# Patient Record
Sex: FEMALE | Race: WHITE | NOT HISPANIC OR LATINO | Employment: FULL TIME | URBAN - METROPOLITAN AREA
[De-identification: names, ages, dates, MRNs, and addresses within clinical notes are randomized per-mention and may not be internally consistent; named-entity substitution may affect disease eponyms.]

---

## 2021-11-22 ENCOUNTER — PRE-ADMISSION TESTING (OUTPATIENT)
Dept: ADMISSIONS | Facility: MEDICAL CENTER | Age: 31
End: 2021-11-22
Attending: OBSTETRICS & GYNECOLOGY
Payer: COMMERCIAL

## 2021-11-24 ENCOUNTER — PRE-ADMISSION TESTING (OUTPATIENT)
Dept: ADMISSIONS | Facility: MEDICAL CENTER | Age: 31
End: 2021-11-24
Attending: OBSTETRICS & GYNECOLOGY
Payer: COMMERCIAL

## 2021-11-24 DIAGNOSIS — Z01.812 PRE-PROCEDURAL LABORATORY EXAMINATION: ICD-10-CM

## 2021-11-24 LAB
ANION GAP SERPL CALC-SCNC: 12 MMOL/L (ref 7–16)
B-HCG SERPL-ACNC: <1 MIU/ML (ref 0–5)
BASOPHILS # BLD AUTO: 0.3 % (ref 0–1.8)
BASOPHILS # BLD: 0.02 K/UL (ref 0–0.12)
BUN SERPL-MCNC: 14 MG/DL (ref 8–22)
CALCIUM SERPL-MCNC: 9.7 MG/DL (ref 8.5–10.5)
CHLORIDE SERPL-SCNC: 105 MMOL/L (ref 96–112)
CO2 SERPL-SCNC: 24 MMOL/L (ref 20–33)
CREAT SERPL-MCNC: 0.82 MG/DL (ref 0.5–1.4)
EOSINOPHIL # BLD AUTO: 0.12 K/UL (ref 0–0.51)
EOSINOPHIL NFR BLD: 1.7 % (ref 0–6.9)
ERYTHROCYTE [DISTWIDTH] IN BLOOD BY AUTOMATED COUNT: 40.7 FL (ref 35.9–50)
GLUCOSE SERPL-MCNC: 89 MG/DL (ref 65–99)
HCT VFR BLD AUTO: 42.3 % (ref 37–47)
HGB BLD-MCNC: 13.8 G/DL (ref 12–16)
IMM GRANULOCYTES # BLD AUTO: 0.03 K/UL (ref 0–0.11)
IMM GRANULOCYTES NFR BLD AUTO: 0.4 % (ref 0–0.9)
LYMPHOCYTES # BLD AUTO: 2.16 K/UL (ref 1–4.8)
LYMPHOCYTES NFR BLD: 31.4 % (ref 22–41)
MCH RBC QN AUTO: 28.2 PG (ref 27–33)
MCHC RBC AUTO-ENTMCNC: 32.6 G/DL (ref 33.6–35)
MCV RBC AUTO: 86.5 FL (ref 81.4–97.8)
MONOCYTES # BLD AUTO: 0.51 K/UL (ref 0–0.85)
MONOCYTES NFR BLD AUTO: 7.4 % (ref 0–13.4)
NEUTROPHILS # BLD AUTO: 4.04 K/UL (ref 2–7.15)
NEUTROPHILS NFR BLD: 58.8 % (ref 44–72)
NRBC # BLD AUTO: 0 K/UL
NRBC BLD-RTO: 0 /100 WBC
PLATELET # BLD AUTO: 359 K/UL (ref 164–446)
PMV BLD AUTO: 9.4 FL (ref 9–12.9)
POTASSIUM SERPL-SCNC: 4.4 MMOL/L (ref 3.6–5.5)
RBC # BLD AUTO: 4.89 M/UL (ref 4.2–5.4)
SODIUM SERPL-SCNC: 141 MMOL/L (ref 135–145)
WBC # BLD AUTO: 6.9 K/UL (ref 4.8–10.8)

## 2021-11-24 PROCEDURE — 80048 BASIC METABOLIC PNL TOTAL CA: CPT

## 2021-11-24 PROCEDURE — 85025 COMPLETE CBC W/AUTO DIFF WBC: CPT

## 2021-11-24 PROCEDURE — 84702 CHORIONIC GONADOTROPIN TEST: CPT

## 2021-11-24 PROCEDURE — 36415 COLL VENOUS BLD VENIPUNCTURE: CPT

## 2021-12-03 NOTE — H&P
DATE OF ADMISSION:  12/06/2021     CHIEF COMPLAINT:  Chronic pelvic pain with heavy vaginal bleeding.     HISTORY OF PRESENT ILLNESS:  This is a 31-year-old female who presented   approximately 2 months ago with complaint of chronic pain and heavy irregular   vaginal bleeding.  She had been using in the past Depo-Provera and a Mirena   IUD.  She tolerated both of these poorly.  They did not help with her   associated pain.  She describes periods are prolonged with passing of clots.    She has more pain with intercourse and sometimes pain with defecation during   her menstrual cycle.  Contraceptions, her  has a vasectomy.  She has   had 2 vaginal births.     PAST MEDICAL HISTORY:  She has a history of migraine headaches.     PAST SURGICAL HISTORY:  She has had no prior surgery.     ALLERGIES:  LATEX sensitivity.     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  Drinks alcohol on   occasion.  Denies tobacco use currently, or recreational drug use.     FAMILY HISTORY:  Mother has a history of diabetes.     REVIEW OF SYSTEMS:  She has a history of weight gain, breast pain, anxiety,   depression, and constipation.     PHYSICAL EXAMINATION:  VITAL SIGNS:  Her height is 5 feet 5 inches, her weight is 187.  Her BMI is   31, blood pressure 122/82.  She is afebrile.  GENERAL APPEARANCE:  The patient is overweight, otherwise healthy appearing,   in no distress.  LUNGS:  Clear to auscultation.  HEART:  Regular rate and rhythm.  BREASTS:  No dominant masses.  ABDOMEN:  Soft and nontender.  GYNECOLOGIC:  Uterus is seemingly normal in size and nontender.  She does have   tenderness in the cul-de-sac with possible uterosacral nodularity.  Adnexa   are benign bilaterally.     IMPRESSION:  Chronic pelvic pain with abnormal uterine bleeding, desires   hysterectomy.     PLAN:  The patient is scheduled to undergo laparoscopic-assisted vaginal   hysterectomy with bilateral salpingectomy and possible bilateral  oophorectomy.    She understands the associated risks and complications of the surgery.  She   understands the limitations and pain relief and pain control.  She understands   the possibility of converting to an open procedure in the event of inability   to complete the surgery vaginally.  Understanding of the risks and   complications she wants to proceed with the surgery as scheduled.        ______________________________  MD JAVIER HERNANDEZ/ANABEL    DD:  12/02/2021 16:01  DT:  12/02/2021 17:02    Job#:  [id number]

## 2021-12-06 ENCOUNTER — HOSPITAL ENCOUNTER (OUTPATIENT)
Facility: MEDICAL CENTER | Age: 31
End: 2021-12-06
Attending: OBSTETRICS & GYNECOLOGY | Admitting: OBSTETRICS & GYNECOLOGY
Payer: COMMERCIAL

## 2021-12-06 ENCOUNTER — ANESTHESIA (OUTPATIENT)
Dept: SURGERY | Facility: MEDICAL CENTER | Age: 31
End: 2021-12-06
Payer: COMMERCIAL

## 2021-12-06 ENCOUNTER — ANESTHESIA EVENT (OUTPATIENT)
Dept: SURGERY | Facility: MEDICAL CENTER | Age: 31
End: 2021-12-06
Payer: COMMERCIAL

## 2021-12-06 VITALS
HEART RATE: 81 BPM | DIASTOLIC BLOOD PRESSURE: 63 MMHG | BODY MASS INDEX: 29.83 KG/M2 | HEIGHT: 67 IN | RESPIRATION RATE: 16 BRPM | WEIGHT: 190.04 LBS | OXYGEN SATURATION: 93 % | SYSTOLIC BLOOD PRESSURE: 108 MMHG | TEMPERATURE: 98 F

## 2021-12-06 LAB
EXTERNAL QUALITY CONTROL: NORMAL
HCG UR QL: NEGATIVE
PATHOLOGY CONSULT NOTE: NORMAL
SARS-COV+SARS-COV-2 AG RESP QL IA.RAPID: NEGATIVE

## 2021-12-06 PROCEDURE — 700111 HCHG RX REV CODE 636 W/ 250 OVERRIDE (IP): Performed by: ANESTHESIOLOGY

## 2021-12-06 PROCEDURE — 501572 HCHG TROCAR, SHIELD OBTU 5X100: Performed by: OBSTETRICS & GYNECOLOGY

## 2021-12-06 PROCEDURE — 160048 HCHG OR STATISTICAL LEVEL 1-5: Performed by: OBSTETRICS & GYNECOLOGY

## 2021-12-06 PROCEDURE — 502240 HCHG MISC OR SUPPLY RC 0272: Performed by: OBSTETRICS & GYNECOLOGY

## 2021-12-06 PROCEDURE — 160035 HCHG PACU - 1ST 60 MINS PHASE I: Performed by: OBSTETRICS & GYNECOLOGY

## 2021-12-06 PROCEDURE — 160002 HCHG RECOVERY MINUTES (STAT): Performed by: OBSTETRICS & GYNECOLOGY

## 2021-12-06 PROCEDURE — 160046 HCHG PACU - 1ST 60 MINS PHASE II: Performed by: OBSTETRICS & GYNECOLOGY

## 2021-12-06 PROCEDURE — 700101 HCHG RX REV CODE 250: Performed by: OBSTETRICS & GYNECOLOGY

## 2021-12-06 PROCEDURE — 700102 HCHG RX REV CODE 250 W/ 637 OVERRIDE(OP): Performed by: ANESTHESIOLOGY

## 2021-12-06 PROCEDURE — 700105 HCHG RX REV CODE 258: Performed by: OBSTETRICS & GYNECOLOGY

## 2021-12-06 PROCEDURE — 160041 HCHG SURGERY MINUTES - EA ADDL 1 MIN LEVEL 4: Performed by: OBSTETRICS & GYNECOLOGY

## 2021-12-06 PROCEDURE — 160025 RECOVERY II MINUTES (STATS): Performed by: OBSTETRICS & GYNECOLOGY

## 2021-12-06 PROCEDURE — 88307 TISSUE EXAM BY PATHOLOGIST: CPT

## 2021-12-06 PROCEDURE — 501330 HCHG SET, CYSTO IRRIG TUBING: Performed by: OBSTETRICS & GYNECOLOGY

## 2021-12-06 PROCEDURE — 81025 URINE PREGNANCY TEST: CPT

## 2021-12-06 PROCEDURE — 500886 HCHG PACK, LAPAROSCOPY: Performed by: OBSTETRICS & GYNECOLOGY

## 2021-12-06 PROCEDURE — 501411 HCHG SPONGE, BABY LAP W/O RINGS: Performed by: OBSTETRICS & GYNECOLOGY

## 2021-12-06 PROCEDURE — 160029 HCHG SURGERY MINUTES - 1ST 30 MINS LEVEL 4: Performed by: OBSTETRICS & GYNECOLOGY

## 2021-12-06 PROCEDURE — 160009 HCHG ANES TIME/MIN: Performed by: OBSTETRICS & GYNECOLOGY

## 2021-12-06 PROCEDURE — 500868 HCHG NEEDLE, SURGI(VARES): Performed by: OBSTETRICS & GYNECOLOGY

## 2021-12-06 PROCEDURE — 502704 HCHG DEVICE, LIGASURE IMPACT: Performed by: OBSTETRICS & GYNECOLOGY

## 2021-12-06 PROCEDURE — 501568 HCHG TROCAR, BLUNTPORT 12MM: Performed by: OBSTETRICS & GYNECOLOGY

## 2021-12-06 PROCEDURE — 501583 HCHG TROCAR, THRD CAN&SEAL 5X100: Performed by: OBSTETRICS & GYNECOLOGY

## 2021-12-06 PROCEDURE — 700101 HCHG RX REV CODE 250: Performed by: ANESTHESIOLOGY

## 2021-12-06 PROCEDURE — 87426 SARSCOV CORONAVIRUS AG IA: CPT | Performed by: OBSTETRICS & GYNECOLOGY

## 2021-12-06 PROCEDURE — 501838 HCHG SUTURE GENERAL: Performed by: OBSTETRICS & GYNECOLOGY

## 2021-12-06 PROCEDURE — A9270 NON-COVERED ITEM OR SERVICE: HCPCS | Performed by: ANESTHESIOLOGY

## 2021-12-06 RX ORDER — HALOPERIDOL 5 MG/ML
1 INJECTION INTRAMUSCULAR
Status: DISCONTINUED | OUTPATIENT
Start: 2021-12-06 | End: 2021-12-06 | Stop reason: HOSPADM

## 2021-12-06 RX ORDER — EPINEPHRINE 1 MG/ML(1)
AMPUL (ML) INJECTION
Status: DISCONTINUED
Start: 2021-12-06 | End: 2021-12-06 | Stop reason: HOSPADM

## 2021-12-06 RX ORDER — LIDOCAINE HYDROCHLORIDE 40 MG/ML
SOLUTION TOPICAL PRN
Status: DISCONTINUED | OUTPATIENT
Start: 2021-12-06 | End: 2021-12-06 | Stop reason: SURG

## 2021-12-06 RX ORDER — ONDANSETRON 2 MG/ML
4 INJECTION INTRAMUSCULAR; INTRAVENOUS
Status: DISCONTINUED | OUTPATIENT
Start: 2021-12-06 | End: 2021-12-06 | Stop reason: HOSPADM

## 2021-12-06 RX ORDER — SODIUM CHLORIDE, SODIUM LACTATE, POTASSIUM CHLORIDE, CALCIUM CHLORIDE 600; 310; 30; 20 MG/100ML; MG/100ML; MG/100ML; MG/100ML
INJECTION, SOLUTION INTRAVENOUS CONTINUOUS
Status: DISCONTINUED | OUTPATIENT
Start: 2021-12-06 | End: 2021-12-06 | Stop reason: HOSPADM

## 2021-12-06 RX ORDER — KETOROLAC TROMETHAMINE 30 MG/ML
INJECTION, SOLUTION INTRAMUSCULAR; INTRAVENOUS PRN
Status: DISCONTINUED | OUTPATIENT
Start: 2021-12-06 | End: 2021-12-06 | Stop reason: SURG

## 2021-12-06 RX ORDER — BUPIVACAINE HYDROCHLORIDE 2.5 MG/ML
INJECTION, SOLUTION EPIDURAL; INFILTRATION; INTRACAUDAL
Status: DISCONTINUED
Start: 2021-12-06 | End: 2021-12-06 | Stop reason: HOSPADM

## 2021-12-06 RX ORDER — MIDAZOLAM HYDROCHLORIDE 1 MG/ML
INJECTION INTRAMUSCULAR; INTRAVENOUS PRN
Status: DISCONTINUED | OUTPATIENT
Start: 2021-12-06 | End: 2021-12-06 | Stop reason: SURG

## 2021-12-06 RX ORDER — FUROSEMIDE 10 MG/ML
INJECTION INTRAMUSCULAR; INTRAVENOUS
Status: COMPLETED
Start: 2021-12-06 | End: 2021-12-06

## 2021-12-06 RX ORDER — DIPHENHYDRAMINE HYDROCHLORIDE 50 MG/ML
12.5 INJECTION INTRAMUSCULAR; INTRAVENOUS
Status: DISCONTINUED | OUTPATIENT
Start: 2021-12-06 | End: 2021-12-06 | Stop reason: HOSPADM

## 2021-12-06 RX ORDER — OXYCODONE HYDROCHLORIDE AND ACETAMINOPHEN 5; 325 MG/1; MG/1
1 TABLET ORAL
Status: COMPLETED | OUTPATIENT
Start: 2021-12-06 | End: 2021-12-06

## 2021-12-06 RX ORDER — DEXAMETHASONE SODIUM PHOSPHATE 4 MG/ML
INJECTION, SOLUTION INTRA-ARTICULAR; INTRALESIONAL; INTRAMUSCULAR; INTRAVENOUS; SOFT TISSUE PRN
Status: DISCONTINUED | OUTPATIENT
Start: 2021-12-06 | End: 2021-12-06 | Stop reason: SURG

## 2021-12-06 RX ORDER — HYDROMORPHONE HYDROCHLORIDE 2 MG/ML
INJECTION, SOLUTION INTRAMUSCULAR; INTRAVENOUS; SUBCUTANEOUS PRN
Status: DISCONTINUED | OUTPATIENT
Start: 2021-12-06 | End: 2021-12-06 | Stop reason: SURG

## 2021-12-06 RX ORDER — ONDANSETRON 2 MG/ML
INJECTION INTRAMUSCULAR; INTRAVENOUS PRN
Status: DISCONTINUED | OUTPATIENT
Start: 2021-12-06 | End: 2021-12-06 | Stop reason: SURG

## 2021-12-06 RX ORDER — FUROSEMIDE 10 MG/ML
INJECTION INTRAMUSCULAR; INTRAVENOUS PRN
Status: DISCONTINUED | OUTPATIENT
Start: 2021-12-06 | End: 2021-12-06 | Stop reason: SURG

## 2021-12-06 RX ORDER — CEFAZOLIN SODIUM 1 G/3ML
INJECTION, POWDER, FOR SOLUTION INTRAMUSCULAR; INTRAVENOUS PRN
Status: DISCONTINUED | OUTPATIENT
Start: 2021-12-06 | End: 2021-12-06 | Stop reason: SURG

## 2021-12-06 RX ORDER — BUPIVACAINE HYDROCHLORIDE AND EPINEPHRINE 2.5; 5 MG/ML; UG/ML
INJECTION, SOLUTION EPIDURAL; INFILTRATION; INTRACAUDAL; PERINEURAL
Status: DISCONTINUED | OUTPATIENT
Start: 2021-12-06 | End: 2021-12-06 | Stop reason: HOSPADM

## 2021-12-06 RX ORDER — OXYCODONE HYDROCHLORIDE AND ACETAMINOPHEN 5; 325 MG/1; MG/1
2 TABLET ORAL
Status: COMPLETED | OUTPATIENT
Start: 2021-12-06 | End: 2021-12-06

## 2021-12-06 RX ORDER — ROCURONIUM BROMIDE 10 MG/ML
INJECTION, SOLUTION INTRAVENOUS PRN
Status: DISCONTINUED | OUTPATIENT
Start: 2021-12-06 | End: 2021-12-06 | Stop reason: SURG

## 2021-12-06 RX ADMIN — KETOROLAC TROMETHAMINE 30 MG: 30 INJECTION, SOLUTION INTRAMUSCULAR at 13:38

## 2021-12-06 RX ADMIN — SUGAMMADEX 200 MG: 100 INJECTION, SOLUTION INTRAVENOUS at 15:00

## 2021-12-06 RX ADMIN — FENTANYL CITRATE 100 MCG: 50 INJECTION, SOLUTION INTRAMUSCULAR; INTRAVENOUS at 13:28

## 2021-12-06 RX ADMIN — SODIUM CHLORIDE, POTASSIUM CHLORIDE, SODIUM LACTATE AND CALCIUM CHLORIDE: 600; 310; 30; 20 INJECTION, SOLUTION INTRAVENOUS at 15:00

## 2021-12-06 RX ADMIN — ONDANSETRON 4 MG: 2 INJECTION INTRAMUSCULAR; INTRAVENOUS at 15:00

## 2021-12-06 RX ADMIN — FUROSEMIDE 10 MG: 10 INJECTION, SOLUTION INTRAMUSCULAR; INTRAVENOUS at 15:14

## 2021-12-06 RX ADMIN — HYDROMORPHONE HYDROCHLORIDE 1 MG: 2 INJECTION, SOLUTION INTRAMUSCULAR; INTRAVENOUS; SUBCUTANEOUS at 14:21

## 2021-12-06 RX ADMIN — CEFAZOLIN 2 G: 330 INJECTION, POWDER, FOR SOLUTION INTRAMUSCULAR; INTRAVENOUS at 13:31

## 2021-12-06 RX ADMIN — ROCURONIUM BROMIDE 50 MG: 10 INJECTION, SOLUTION INTRAVENOUS at 13:28

## 2021-12-06 RX ADMIN — FENTANYL CITRATE 50 MCG: 50 INJECTION, SOLUTION INTRAMUSCULAR; INTRAVENOUS at 15:41

## 2021-12-06 RX ADMIN — SODIUM CHLORIDE, POTASSIUM CHLORIDE, SODIUM LACTATE AND CALCIUM CHLORIDE: 600; 310; 30; 20 INJECTION, SOLUTION INTRAVENOUS at 12:56

## 2021-12-06 RX ADMIN — MIDAZOLAM HYDROCHLORIDE 2 MG: 1 INJECTION, SOLUTION INTRAMUSCULAR; INTRAVENOUS at 13:28

## 2021-12-06 RX ADMIN — DEXAMETHASONE SODIUM PHOSPHATE 8 MG: 4 INJECTION, SOLUTION INTRA-ARTICULAR; INTRALESIONAL; INTRAMUSCULAR; INTRAVENOUS; SOFT TISSUE at 13:38

## 2021-12-06 RX ADMIN — PROPOFOL 150 MG: 10 INJECTION, EMULSION INTRAVENOUS at 13:28

## 2021-12-06 RX ADMIN — FLUORESCEIN SODIUM 4 ML: 100 INJECTION INTRAVENOUS at 15:06

## 2021-12-06 RX ADMIN — LIDOCAINE HYDROCHLORIDE 4 ML: 40 SOLUTION TOPICAL at 13:29

## 2021-12-06 RX ADMIN — OXYCODONE HYDROCHLORIDE AND ACETAMINOPHEN 1 TABLET: 5; 325 TABLET ORAL at 15:54

## 2021-12-06 ASSESSMENT — PAIN SCALES - GENERAL: PAIN_LEVEL: 3

## 2021-12-06 NOTE — ANESTHESIA PROCEDURE NOTES
Airway    Date/Time: 12/6/2021 1:29 PM  Performed by: Vignesh Rey M.D.  Authorized by: Vignesh Rey M.D.     Location:  OR  Urgency:  Elective  Indications for Airway Management:  Anesthesia      Spontaneous Ventilation: absent    Sedation Level:  Deep  Preoxygenated: Yes    Patient Position:  Sniffing  Final Airway Type:  Endotracheal airway  Final Endotracheal Airway:  ETT  Cuffed: Yes    Technique Used for Successful ETT Placement:  Direct laryngoscopy    Insertion Site:  Oral  Blade Type:  Fredo  Laryngoscope Blade/Videolaryngoscope Blade Size:  3  ETT Size (mm):  6.5  Measured from:  Teeth  ETT to Teeth (cm):  22  Placement Verified by: auscultation and capnometry    Cormack-Lehane Classification:  Grade I - full view of glottis  Number of Attempts at Approach:  1

## 2021-12-06 NOTE — OR SURGEON
Immediate Post OP Note    PreOp Diagnosis: pelvic pain, menorrhagia      PostOp Diagnosis: endometriosis, menorrhagia      Procedure(s):  HYSTERECTOMY, TOTAL, VAGINAL, LAPAROSCOPY-ASSISTED - Wound Class: Clean Contaminated  SALPINGECTOMY - Wound Class: Clean Contaminated  CYSTOSCOPY - Wound Class: Clean Contaminated    Surgeon(s):  EUGENIA Arrington M.D.    Anesthesiologist/Type of Anesthesia:  Anesthesiologist: Vignesh Rey M.D./General    Surgical Staff:  Circulator: Georgina Ardon R.N.  Scrub Person: Kandis Moulton [place]: Keila Oseguera R.N.    Specimens removed if any:  ID Type Source Tests Collected by Time Destination   A : Uterus, Cervix, Bilateral Fallopian Tubes Other Other PATHOLOGY SPECIMEN Alireza Slaughter M.D. 12/6/2021  2:44 PM        Estimated Blood Loss: 100cc    Findings: implants and adhesions in cul de sac consistant with mild to moderate endometriosis    Complications: none noted        12/6/2021 3:33 PM Alireza Slaughter M.D.

## 2021-12-06 NOTE — ANESTHESIA TIME REPORT
Anesthesia Start and Stop Event Times     Date Time Event    12/6/2021 1314 Ready for Procedure     1326 Anesthesia Start     1530 Anesthesia Stop        Responsible Staff  12/06/21    Name Role Begin End    Vignesh Rey M.D. Anesth 1326 1530        Preop Diagnosis (Free Text):  Pre-op Diagnosis     ABNORMAL UTERINE BLEEDING, CHRONIC PELVIC PAIN        Preop Diagnosis (Codes):    Premium Reason  A. 3PM - 7AM    Comments:

## 2021-12-06 NOTE — ANESTHESIA PREPROCEDURE EVALUATION
Case: [id number] Date/Time: 12/06/21 1245    Procedures:       HYSTERECTOMY, TOTAL, VAGINAL, LAPAROSCOPY-ASSISTED      SALPINGECTOMY (Bilateral )    Pre-op diagnosis: ABNORMAL UTERINE BLEEDING, CHRONIC PELVIC PAIN    Location: [facility] ROOM 23 / SURGERY SAME DAY [facility]    Surgeons: Alireza Slaughter M.D.          Relevant Problems   No relevant active problems       Physical Exam    Airway   Mallampati: II  TM distance: >3 FB  Neck ROM: full       Cardiovascular - normal exam  Rhythm: regular  Rate: normal  (-) murmur     Dental - normal exam           Pulmonary - normal exam  Breath sounds clear to auscultation     Abdominal    Neurological - normal exam                 Anesthesia Plan    ASA 1       Plan - general       Airway plan will be ETT        Plan Factors:   Patient was previously instructed to abstain from smoking on day of procedure.  Patient did not smoke on day of procedure.      Induction: intravenous    Postoperative Plan: Postoperative administration of opioids is intended.    Pertinent diagnostic labs and testing reviewed    Informed Consent:    Anesthetic plan and risks discussed with patient.    Use of blood products discussed with: patient whom consented to blood products.

## 2021-12-07 NOTE — OP REPORT
DATE OF SERVICE:  12/06/2021     PREOPERATIVE DIAGNOSES:  1.  Chronic pelvic pain.  2.  Menorrhagia.     POSTOPERATIVE DIAGNOSES:  1.  Mild to moderate endometriosis.  2.  Menorrhagia.     OPERATION:  Laparoscopic-assisted total vaginal hysterectomy with bilateral   salpingectomy.     SURGEON:  Alireza Slaughter MD     ASSISTANT:  Marlys Shaw MD     ANESTHESIA:  General.     ANESTHESIOLOGIST:  Vignesh Rey MD     ESTIMATED BLOOD LOSS:  100 mL.     OPERATIVE FINDINGS:  There was superficial and deep implants of endometriosis   on the posterior cul-de-sac along the uterosacral ligaments with some   adhesions of the sigmoid colon.     DESCRIPTION OF PROCEDURE:  The patient was taken to the operating room where   she was placed under general anesthesia in lithotomy position and sterilely   prepped and draped in normal fashion.  Initial attempt at placing Veress   needle through the umbilicus to obtain physoperitoneum was unsuccessful x2.    Therefore, through the umbilical incision was approximately 1.5 cm in size.    The tissue was dissected down to the fascia, which was grasped with Kocher   clamps and elevated and incised.  Each of the edges of this fascia was   attached with 0 Vicryl suture for later closure.  The peritoneal cavity was   entered with the fascial dissection; therefore, a Mita cannula was placed   and the Working scope was placed through this port.  A separate 5 mm port was   placed in the midline suprapubically for the use of a Prestige grasping tool.    The ligature bipolar device was used as a vessel sealing device through the   laparoscope portion of the surgery.  The above findings were noted.  The   ovaries were not involved with endometriosis or adherent to the sidewall.    They were left intact.  Initially, the fallopian tubes were placed on traction   and the mesosalpinx cauterized and transected and the fallopian tube was   removed.  Next, the round ligaments and utero-ovarian  pedicles were cauterized   and transected.  This dissection was carried out along the broad ligament and   the anterior peritoneal reflection of the bladder was developed to develop   the bladder from the lower uterine segment.  The uterine artery bundles were   each cauterized on each side and transected.  After this was complete, the   instruments and carbon dioxide was released and the patient was repositioned   for the vaginal surgery.     Tenaculums were placed on the anterior and posterior lip of the uterus and   traction of the cervix was just above the introitus.  The cervix was   infiltrated with 0.25% Marcaine with epinephrine and circumscribed with a   scalpel.  Posterior cul-de-sac was entered sharply and the long weighted   speculum placed for exposure.  The bladder was developed in the lower uterine   segment anteriorly with blunt and sharp dissection and then pedicles were   obtained along the uterosacral ligaments and cardinal ligaments and each   secured with 0 Vicryl suture.  The anterior peritoneal reflection was then   located and entered and then final pedicle of tissue primarily portions of the   rest of the cardinal ligament and uterine artery bundles were clamped, cut   and suture ligated on each side.  The uterus was removed intact.  Pedicles   appeared hemostatic; therefore, the cuff was closed anterior to posterior with   running locking 0 Vicryl suture.     The patient was repositioned for the laparoscopy and the pedicles were all   visualized.  There was excellent hemostasis.  Hemoblast was placed to help   with any further bleeding.  The instruments were removed and the carbon   dioxide was released.  The previously attached 0 Vicryl suture on the fascia   was tied to close the fascia.  A separate 0 Vicryl suture was placed in the   deeper subcutaneous tissue of the umbilicus and the skin or cuticular layer   both incisions were closed with 4-0 Vicryl subcuticular suture.     The  patient was then repositioned for cystoscopy.  The bladder was removed and   a cystoscope placed.  It did require some time because it appeared that the   patient was dehydrated and I did use fluorescein which demonstrated bilateral   ureteral patency with an intact bladder dome.  The patient's [place] was left   out.  The patient was awakened and taken to the recovery room in stable   condition.  At the end of surgery, all counts were correct and no   complications were noted.        ______________________________  MD JAVIER HERNANDEZ/DARNELL    DD:  12/06/2021 15:39  DT:  12/06/2021 16:17    Job#:  [id number]

## 2021-12-07 NOTE — OR NURSING
1528- patient arrived to pacu.  2 identifiers completed by 2 RN.  Two lap sites, band aids covering.  Band aids clean dry and intact.  Martha-Pad clean with no noted drainage.  Report received from anesthesia and OR RN.      1600- provided bedpan.     1615- patient assisted to restroom to void.    1635- DC instructions reviewed with patient's significant other and friend. Hysterectomy handout reviewed too.  All questions answered. PIV removed. Patient reports feeling ready to DC.     1641- patient taken via wheelchair with RN to responsible adult.  All belongings accounted for.

## 2021-12-07 NOTE — DISCHARGE INSTRUCTIONS
ACTIVITY: Rest and take it easy for the first 24 hours.  A responsible adult is recommended to remain with you during that time.  It is normal to feel sleepy.  We encourage you to not do anything that requires balance, judgment or coordination.    MILD FLU-LIKE SYMPTOMS ARE NORMAL. YOU MAY EXPERIENCE GENERALIZED MUSCLE ACHES, THROAT IRRITATION, HEADACHE AND/OR SOME NAUSEA.    FOR 24 HOURS DO NOT:  Drive, operate machinery or run household appliances.  Drink beer or alcoholic beverages.   Make important decisions or sign legal documents.    SPECIAL INSTRUCTIONS: SEE ATTACHED HANDOUT    DIET: To avoid nausea, slowly advance diet as tolerated, avoiding spicy or greasy foods for the first day.  Add more substantial food to your diet according to your physician's instructions.  Babies can be fed formula or breast milk as soon as they are hungry.  INCREASE FLUIDS AND FIBER TO AVOID CONSTIPATION.    SURGICAL DRESSING/BATHING: SEE ATTACHED HANDOUT    FOLLOW-UP APPOINTMENT:  A follow-up appointment should be arranged with your doctor; call to schedule.    You should CALL YOUR PHYSICIAN if you develop:  Fever greater than 101 degrees F.  Pain not relieved by medication, or persistent nausea or vomiting.  Excessive bleeding (blood soaking through dressing) or unexpected drainage from the wound.  Extreme redness or swelling around the incision site, drainage of pus or foul smelling drainage.  Inability to urinate or empty your bladder within 8 hours.  Problems with breathing or chest pain.    You should call 911 if you develop problems with breathing or chest pain.  If you are unable to contact your doctor or surgical center, you should go to the nearest emergency room or urgent care center.  Physician's telephone #: DR. JOSE [phone]    If any questions arise, call your doctor.  If your doctor is not available, please feel free to call the Surgical Center at [phone].     A registered nurse may call you a few  days after your surgery to see how you are doing after your procedure.    MEDICATIONS: Resume taking daily medication.  Take prescribed pain medication with food.  If no medication is prescribed, you may take non-aspirin pain medication if needed.  PAIN MEDICATION CAN BE VERY CONSTIPATING.  Take a stool softener or laxative such as senokot, pericolace, or milk of magnesia if needed.     Last pain medication given at 4:00 pm (Percocet).    If your physician has prescribed pain medication that includes Acetaminophen (Tylenol), do not take additional Acetaminophen (Tylenol) while taking the prescribed medication.    Depression / Suicide Risk    As you are discharged from this formerly [facility] facility, it is important to learn how to keep safe from harming yourself.    Recognize the warning signs:  · Abrupt changes in personality, positive or negative- including increase in energy   · Giving away possessions  · Change in eating patterns- significant weight changes-  positive or negative  · Change in sleeping patterns- unable to sleep or sleeping all the time   · Unwillingness or inability to communicate  · Depression  · Unusual sadness, discouragement and loneliness  · Talk of wanting to die  · Neglect of personal appearance   · Rebelliousness- reckless behavior  · Withdrawal from people/activities they love  · Confusion- inability to concentrate     If you or a loved one observes any of these behaviors or has concerns about self-harm, here's what you can do:  · Talk about it- your feelings and reasons for harming yourself  · Remove any means that you might use to hurt yourself (examples: pills, rope, extension cords, firearm)  · Get professional help from the community (Mental Health, Substance Abuse, psychological counseling)  · Do not be alone:Call your Safe Contact- someone whom you trust who will be there for you.  · Call your local CRISIS HOTLINE 984-2050 or 328-345-0274  · Call your local Children's Mobile Crisis  Response Team [facility] (683) 455-6070 or www.[place].Alo7  · Call the toll free National Suicide Prevention Hotlines   · National Suicide Prevention Lifeline 984-455-NCZA (2094)  · National Hope Line Network 800-SUICIDE (143-1482)

## 2023-01-05 SDOH — ECONOMIC STABILITY: TRANSPORTATION INSECURITY
IN THE PAST 12 MONTHS, HAS THE LACK OF TRANSPORTATION KEPT YOU FROM MEDICAL APPOINTMENTS OR FROM GETTING MEDICATIONS?: NO

## 2023-01-05 SDOH — ECONOMIC STABILITY: FOOD INSECURITY: WITHIN THE PAST 12 MONTHS, THE FOOD YOU BOUGHT JUST DIDN'T LAST AND YOU DIDN'T HAVE MONEY TO GET MORE.: NEVER TRUE

## 2023-01-05 SDOH — ECONOMIC STABILITY: TRANSPORTATION INSECURITY
IN THE PAST 12 MONTHS, HAS LACK OF TRANSPORTATION KEPT YOU FROM MEETINGS, WORK, OR FROM GETTING THINGS NEEDED FOR DAILY LIVING?: NO

## 2023-01-05 SDOH — ECONOMIC STABILITY: INCOME INSECURITY: HOW HARD IS IT FOR YOU TO PAY FOR THE VERY BASICS LIKE FOOD, HOUSING, MEDICAL CARE, AND HEATING?: NOT VERY HARD

## 2023-01-05 SDOH — ECONOMIC STABILITY: HOUSING INSECURITY: IN THE LAST 12 MONTHS, HOW MANY PLACES HAVE YOU LIVED?: 1

## 2023-01-05 SDOH — ECONOMIC STABILITY: HOUSING INSECURITY
IN THE LAST 12 MONTHS, WAS THERE A TIME WHEN YOU DID NOT HAVE A STEADY PLACE TO SLEEP OR SLEPT IN A SHELTER (INCLUDING NOW)?: NO

## 2023-01-05 SDOH — HEALTH STABILITY: PHYSICAL HEALTH: ON AVERAGE, HOW MANY DAYS PER WEEK DO YOU ENGAGE IN MODERATE TO STRENUOUS EXERCISE (LIKE A BRISK WALK)?: 4 DAYS

## 2023-01-05 SDOH — ECONOMIC STABILITY: TRANSPORTATION INSECURITY
IN THE PAST 12 MONTHS, HAS LACK OF RELIABLE TRANSPORTATION KEPT YOU FROM MEDICAL APPOINTMENTS, MEETINGS, WORK OR FROM GETTING THINGS NEEDED FOR DAILY LIVING?: NO

## 2023-01-05 SDOH — ECONOMIC STABILITY: FOOD INSECURITY: WITHIN THE PAST 12 MONTHS, YOU WORRIED THAT YOUR FOOD WOULD RUN OUT BEFORE YOU GOT MONEY TO BUY MORE.: NEVER TRUE

## 2023-01-05 SDOH — ECONOMIC STABILITY: INCOME INSECURITY: IN THE LAST 12 MONTHS, WAS THERE A TIME WHEN YOU WERE NOT ABLE TO PAY THE MORTGAGE OR RENT ON TIME?: NO

## 2023-01-05 SDOH — HEALTH STABILITY: PHYSICAL HEALTH: ON AVERAGE, HOW MANY MINUTES DO YOU ENGAGE IN EXERCISE AT THIS LEVEL?: 60 MIN

## 2023-01-05 SDOH — HEALTH STABILITY: MENTAL HEALTH
STRESS IS WHEN SOMEONE FEELS TENSE, NERVOUS, ANXIOUS, OR CAN'T SLEEP AT NIGHT BECAUSE THEIR MIND IS TROUBLED. HOW STRESSED ARE YOU?: TO SOME EXTENT

## 2023-01-05 ASSESSMENT — SOCIAL DETERMINANTS OF HEALTH (SDOH)
HOW MANY DRINKS CONTAINING ALCOHOL DO YOU HAVE ON A TYPICAL DAY WHEN YOU ARE DRINKING: 1 OR 2
IN A TYPICAL WEEK, HOW MANY TIMES DO YOU TALK ON THE PHONE WITH FAMILY, FRIENDS, OR NEIGHBORS?: MORE THAN THREE TIMES A WEEK
HOW HARD IS IT FOR YOU TO PAY FOR THE VERY BASICS LIKE FOOD, HOUSING, MEDICAL CARE, AND HEATING?: NOT VERY HARD
WITHIN THE PAST 12 MONTHS, YOU WORRIED THAT YOUR FOOD WOULD RUN OUT BEFORE YOU GOT THE MONEY TO BUY MORE: NEVER TRUE
HOW OFTEN DO YOU GET TOGETHER WITH FRIENDS OR RELATIVES?: THREE TIMES A WEEK
HOW OFTEN DO YOU HAVE SIX OR MORE DRINKS ON ONE OCCASION: LESS THAN MONTHLY
HOW OFTEN DO YOU HAVE A DRINK CONTAINING ALCOHOL: MONTHLY OR LESS
IN A TYPICAL WEEK, HOW MANY TIMES DO YOU TALK ON THE PHONE WITH FAMILY, FRIENDS, OR NEIGHBORS?: MORE THAN THREE TIMES A WEEK
HOW OFTEN DO YOU ATTEND CHURCH OR RELIGIOUS SERVICES?: NEVER
HOW OFTEN DO YOU ATTENT MEETINGS OF THE CLUB OR ORGANIZATION YOU BELONG TO?: NEVER
HOW OFTEN DO YOU ATTENT MEETINGS OF THE CLUB OR ORGANIZATION YOU BELONG TO?: NEVER
HOW OFTEN DO YOU GET TOGETHER WITH FRIENDS OR RELATIVES?: THREE TIMES A WEEK
HOW OFTEN DO YOU ATTEND CHURCH OR RELIGIOUS SERVICES?: NEVER

## 2023-01-05 ASSESSMENT — LIFESTYLE VARIABLES
AUDIT-C TOTAL SCORE: 2
HOW MANY STANDARD DRINKS CONTAINING ALCOHOL DO YOU HAVE ON A TYPICAL DAY: 1 OR 2
HOW OFTEN DO YOU HAVE SIX OR MORE DRINKS ON ONE OCCASION: LESS THAN MONTHLY
HOW OFTEN DO YOU HAVE A DRINK CONTAINING ALCOHOL: MONTHLY OR LESS
SKIP TO QUESTIONS 9-10: 0

## 2023-01-09 ENCOUNTER — OFFICE VISIT (OUTPATIENT)
Dept: MEDICAL GROUP | Facility: PHYSICIAN GROUP | Age: 33
End: 2023-01-09
Payer: COMMERCIAL

## 2023-01-09 VITALS
HEIGHT: 67 IN | OXYGEN SATURATION: 98 % | RESPIRATION RATE: 18 BRPM | SYSTOLIC BLOOD PRESSURE: 124 MMHG | BODY MASS INDEX: 29.82 KG/M2 | TEMPERATURE: 97.4 F | WEIGHT: 190 LBS | DIASTOLIC BLOOD PRESSURE: 76 MMHG | HEART RATE: 89 BPM

## 2023-01-09 DIAGNOSIS — Z00.00 ENCOUNTER FOR HEALTH MAINTENANCE EXAMINATION IN ADULT: ICD-10-CM

## 2023-01-09 DIAGNOSIS — J32.0 CHRONIC MAXILLARY SINUSITIS: ICD-10-CM

## 2023-01-09 DIAGNOSIS — E55.9 VITAMIN D DEFICIENCY: ICD-10-CM

## 2023-01-09 DIAGNOSIS — Z13.220 ENCOUNTER FOR SCREENING FOR LIPID DISORDER: ICD-10-CM

## 2023-01-09 DIAGNOSIS — G43.019 INTRACTABLE MIGRAINE WITHOUT AURA AND WITHOUT STATUS MIGRAINOSUS: ICD-10-CM

## 2023-01-09 PROBLEM — J32.9 CHRONIC SINUSITIS: Status: ACTIVE | Noted: 2023-01-09

## 2023-01-09 PROCEDURE — 99204 OFFICE O/P NEW MOD 45 MIN: CPT | Performed by: NURSE PRACTITIONER

## 2023-01-09 RX ORDER — AMOXICILLIN AND CLAVULANATE POTASSIUM 875; 125 MG/1; MG/1
1 TABLET, FILM COATED ORAL 2 TIMES DAILY
Qty: 20 TABLET | Refills: 0 | Status: SHIPPED | OUTPATIENT
Start: 2023-01-09 | End: 2023-01-19

## 2023-01-09 RX ORDER — DIAZEPAM 5 MG/1
TABLET ORAL
Qty: 2 TABLET | Refills: 0 | Status: SHIPPED | OUTPATIENT
Start: 2023-01-09 | End: 2023-01-10

## 2023-01-09 ASSESSMENT — PATIENT HEALTH QUESTIONNAIRE - PHQ9: CLINICAL INTERPRETATION OF PHQ2 SCORE: 0

## 2023-01-09 NOTE — PROGRESS NOTES
"  Chief Complaint   Patient presents with    \[facility] [place] [facility]\"" Care     New Pt     Sinus Problem     Infection x 4 months, Sudafed, nasal spray, saline rinse, not helping,     Migraine     Discuss medication,     Weight Gain     Unable to lose weight, diet, keto, walking, not helping                                                                                                                                      HPI:   Hayley presents today with the following.    Problem   Chronic Sinusitis   Intractable Migraine Without Aura and Without Status Migrainosus       Current Outpatient Medications   Medication Sig Dispense Refill    Rimegepant Sulfate 75 MG TABLET DISPERSIBLE Take 1 Tablet by mouth 1 time a day as needed (migraine). 8 Tablet 5    Galcanezumab-gnlm 120 MG/ML Solution Prefilled Syringe Inject 240 mg under the skin every 28 days for 28 days, THEN 120 mg every 28 days for 84 days. 1 mL 5    diazePAM (VALIUM) 5 MG Tab Take 1 tablet one hour prior to MRI. Take 2nd tablet at appt time.  Indications: calustrophobia 2 Tablet 0    amoxicillin-clavulanate (AUGMENTIN) 875-125 MG Tab Take 1 Tablet by mouth 2 times a day for 10 days. 20 Tablet 0    Multiple Vitamins-Minerals (ONE-A-DAY WOMENS PO) Take  by mouth every day.       No current facility-administered medications for this visit.       Allergies as of 01/09/2023 - Reviewed 01/09/2023   Allergen Reaction Noted    Bupropion Itching 06/08/2018    Latex Hives and Rash 12/26/2014    Topiramate  09/26/2018    Triptans [sumatriptan]  01/09/2023        ROS:  All systems negative expect as addressed in assessment and plan.     /76 (BP Location: Left arm, Patient Position: Sitting, BP Cuff Size: Adult)   Pulse 89   Temp 36.3 °C (97.4 °F) (Temporal)   Resp 18   Ht 1.702 m (5' 7\")   Wt 86.2 kg (190 lb)   LMP 12/28/2020   SpO2 98%   BMI 29.76 kg/m²       Physical Exam  Vitals reviewed.   Constitutional:       Appearance: Normal appearance.   HENT:      Head: " Normocephalic and atraumatic.      Right Ear: Ear canal normal. A middle ear effusion is present.      Left Ear: Ear canal normal. A middle ear effusion is present.      Mouth/Throat:      Mouth: Mucous membranes are moist.   Eyes:      Extraocular Movements: Extraocular movements intact.      Conjunctiva/sclera: Conjunctivae normal.   Pulmonary:      Effort: Pulmonary effort is normal.   Musculoskeletal:         General: Normal range of motion.      Cervical back: Normal range of motion.   Skin:     General: Skin is warm and dry.   Neurological:      General: No focal deficit present.      Mental Status: She is alert and oriented to person, place, and time.   Psychiatric:         Mood and Affect: Mood normal.         Behavior: Behavior normal.         Thought Content: Thought content normal.         Assessment and Plan:  32 y.o. female with the following issues.    1. Intractable migraine without aura and without status migrainosus  MR-BRAIN-WITH & W/O    diazePAM (VALIUM) 5 MG Tab    TSH    FREE THYROXINE      2. Chronic maxillary sinusitis        3. Encounter for health maintenance examination in adult  Comp Metabolic Panel    CBC WITHOUT DIFFERENTIAL    TSH    FREE THYROXINE    VITAMIN D,25 HYDROXY (DEFICIENCY)    Lipid Profile      4. Encounter for screening for lipid disorder  Lipid Profile      5. Vitamin D deficiency  VITAMIN D,25 HYDROXY (DEFICIENCY)           Intractable migraine without aura and without status migrainosus  This is a chronic condition. She has a long standing history of migraines. Her migraines started around age 6. She has a family history of migraines on both sides of the family. She does get nausea, photosensitivity, and auras. She gets her headaches on both sides.     Patient has been using advil and sinus medication to help with her migraines.     She has tried imitrex, topamax, midrine, and other medications. Patient has also tried botox which made her migraines worse.     Patient  has had imaging in the past at age 16.     Will repeat imaging. Patient is claustrophobic, will provide with valium for MRI.     Obtained and reviewed patient utilization report from [facility] pharmacy database on 1/9/2023 3:14 PM  prior to writing prescription for controlled substance II, III or IV per [place] bill . Based on assessment of the report,my physical exam if necessary and the patient's health problem, the prescription is medically necessary.     Will start emgality monthly. Will provide with nurtec 75mg ODT for abortive therapy.     Chronic sinusitis  This is a chronic condition. Patient has history of frequent sinus infections and chronic sinus congestions and pressure. She also has a history of severe allergies.     Will prescribe Augmentin for 10 days. If no improvement will send referral to ENT and prescribe course of steroids.       Return in about 1 month (around 2/9/2023).      I have placed the below orders and discussed them with an approved delegating provider.  The MA is performing the below orders under the direction of Dr. Waddell.    Please note that this dictation was created using voice recognition software. I have worked with consultants from the vendor as well as technical experts from [facility] to optimize the interface. I have made every reasonable attempt to correct obvious errors, but I expect that there are errors of grammar and possibly content that I did not discover before finalizing the note.

## 2023-01-09 NOTE — ASSESSMENT & PLAN NOTE
This is a chronic condition. She has a long standing history of migraines. Her migraines started around age 6. She has a family history of migraines on both sides of the family. She does get nausea, photosensitivity, and auras. She gets her headaches on both sides.     Patient has been using advil and sinus medication to help with her migraines.     She has tried imitrex, topamax, midrine, and other medications. Patient has also tried botox which made her migraines worse.     Patient has had imaging in the past at age 16.     Will repeat imaging. Patient is claustrophobic, will provide with valium for MRI.     Obtained and reviewed patient utilization report from [facility] pharmacy database on 1/9/2023 3:14 PM  prior to writing prescription for controlled substance II, III or IV per [place] bill . Based on assessment of the report,my physical exam if necessary and the patient's health problem, the prescription is medically necessary.     Will start emgality monthly. Will provide with nurtec 75mg ODT for abortive therapy.

## 2023-01-11 ENCOUNTER — TELEPHONE (OUTPATIENT)
Dept: MEDICAL GROUP | Facility: PHYSICIAN GROUP | Age: 33
End: 2023-01-11

## 2023-01-11 ENCOUNTER — HOSPITAL ENCOUNTER (OUTPATIENT)
Dept: LAB | Facility: MEDICAL CENTER | Age: 33
End: 2023-01-11
Attending: NURSE PRACTITIONER
Payer: COMMERCIAL

## 2023-01-11 DIAGNOSIS — G43.019 INTRACTABLE MIGRAINE WITHOUT AURA AND WITHOUT STATUS MIGRAINOSUS: ICD-10-CM

## 2023-01-11 DIAGNOSIS — Z00.00 ENCOUNTER FOR HEALTH MAINTENANCE EXAMINATION IN ADULT: ICD-10-CM

## 2023-01-11 DIAGNOSIS — Z13.220 ENCOUNTER FOR SCREENING FOR LIPID DISORDER: ICD-10-CM

## 2023-01-11 DIAGNOSIS — E55.9 VITAMIN D DEFICIENCY: ICD-10-CM

## 2023-01-11 LAB
25(OH)D3 SERPL-MCNC: 21 NG/ML (ref 30–100)
ALBUMIN SERPL BCP-MCNC: 4.4 G/DL (ref 3.2–4.9)
ALBUMIN/GLOB SERPL: 1.5 G/DL
ALP SERPL-CCNC: 113 U/L (ref 30–99)
ALT SERPL-CCNC: 24 U/L (ref 2–50)
ANION GAP SERPL CALC-SCNC: 11 MMOL/L (ref 7–16)
AST SERPL-CCNC: 15 U/L (ref 12–45)
BILIRUB SERPL-MCNC: 0.3 MG/DL (ref 0.1–1.5)
BUN SERPL-MCNC: 11 MG/DL (ref 8–22)
CALCIUM ALBUM COR SERPL-MCNC: 9.3 MG/DL (ref 8.5–10.5)
CALCIUM SERPL-MCNC: 9.6 MG/DL (ref 8.5–10.5)
CHLORIDE SERPL-SCNC: 104 MMOL/L (ref 96–112)
CHOLEST SERPL-MCNC: 217 MG/DL (ref 100–199)
CO2 SERPL-SCNC: 23 MMOL/L (ref 20–33)
CREAT SERPL-MCNC: 0.8 MG/DL (ref 0.5–1.4)
ERYTHROCYTE [DISTWIDTH] IN BLOOD BY AUTOMATED COUNT: 40.1 FL (ref 35.9–50)
FASTING STATUS PATIENT QL REPORTED: NORMAL
GFR SERPLBLD CREATININE-BSD FMLA CKD-EPI: 101 ML/MIN/1.73 M 2
GLOBULIN SER CALC-MCNC: 3 G/DL (ref 1.9–3.5)
GLUCOSE SERPL-MCNC: 93 MG/DL (ref 65–99)
HCT VFR BLD AUTO: 44.1 % (ref 37–47)
HDLC SERPL-MCNC: 41 MG/DL
HGB BLD-MCNC: 14.4 G/DL (ref 12–16)
LDLC SERPL CALC-MCNC: 136 MG/DL
MCH RBC QN AUTO: 29.3 PG (ref 27–33)
MCHC RBC AUTO-ENTMCNC: 32.7 G/DL (ref 33.6–35)
MCV RBC AUTO: 89.6 FL (ref 81.4–97.8)
PLATELET # BLD AUTO: 352 K/UL (ref 164–446)
PMV BLD AUTO: 9.7 FL (ref 9–12.9)
POTASSIUM SERPL-SCNC: 4.4 MMOL/L (ref 3.6–5.5)
PROT SERPL-MCNC: 7.4 G/DL (ref 6–8.2)
RBC # BLD AUTO: 4.92 M/UL (ref 4.2–5.4)
SODIUM SERPL-SCNC: 138 MMOL/L (ref 135–145)
T4 FREE SERPL-MCNC: 1.18 NG/DL (ref 0.93–1.7)
TRIGL SERPL-MCNC: 201 MG/DL (ref 0–149)
TSH SERPL DL<=0.005 MIU/L-ACNC: 1.07 UIU/ML (ref 0.38–5.33)
WBC # BLD AUTO: 7.5 K/UL (ref 4.8–10.8)

## 2023-01-11 PROCEDURE — 84443 ASSAY THYROID STIM HORMONE: CPT

## 2023-01-11 PROCEDURE — 85027 COMPLETE CBC AUTOMATED: CPT

## 2023-01-11 PROCEDURE — 80053 COMPREHEN METABOLIC PANEL: CPT

## 2023-01-11 PROCEDURE — 80061 LIPID PANEL: CPT

## 2023-01-11 PROCEDURE — 82306 VITAMIN D 25 HYDROXY: CPT

## 2023-01-11 PROCEDURE — 84439 ASSAY OF FREE THYROXINE: CPT

## 2023-01-11 PROCEDURE — 36415 COLL VENOUS BLD VENIPUNCTURE: CPT

## 2023-01-13 ENCOUNTER — PATIENT MESSAGE (OUTPATIENT)
Dept: MEDICAL GROUP | Facility: PHYSICIAN GROUP | Age: 33
End: 2023-01-13
Payer: COMMERCIAL

## 2023-01-13 NOTE — PATIENT COMMUNICATION
Started antibiotics as prescribed. Pending authorization for new migraine medications.     Yesterday was feeling okay but sluggish.   Today woke up with body aches, severe nasal congestion w/ green drainage. Post nasal drip cough. Severe sinus pain. Has not tried anything OTC.   No fever.   Recommended sinus rinse, hydration, rest and Advil which she says she takes regularly. Continue current medication regimen for now.     I told the patient that I would forward this information to Betty for any further recommendations.

## 2023-02-09 ENCOUNTER — OFFICE VISIT (OUTPATIENT)
Dept: MEDICAL GROUP | Facility: PHYSICIAN GROUP | Age: 33
End: 2023-02-09
Payer: COMMERCIAL

## 2023-02-09 VITALS
OXYGEN SATURATION: 96 % | HEIGHT: 67 IN | DIASTOLIC BLOOD PRESSURE: 76 MMHG | HEART RATE: 95 BPM | WEIGHT: 192.2 LBS | TEMPERATURE: 97.3 F | BODY MASS INDEX: 30.17 KG/M2 | SYSTOLIC BLOOD PRESSURE: 118 MMHG

## 2023-02-09 DIAGNOSIS — G43.019 INTRACTABLE MIGRAINE WITHOUT AURA AND WITHOUT STATUS MIGRAINOSUS: ICD-10-CM

## 2023-02-09 PROCEDURE — 99214 OFFICE O/P EST MOD 30 MIN: CPT | Performed by: NURSE PRACTITIONER

## 2023-02-09 RX ORDER — GALCANEZUMAB 120 MG/ML
INJECTION, SOLUTION SUBCUTANEOUS
Qty: 2 ML | Refills: 0 | Status: SHIPPED | OUTPATIENT
Start: 2023-02-09 | End: 2023-06-07

## 2023-02-09 RX ORDER — GALCANEZUMAB 120 MG/ML
120 INJECTION, SOLUTION SUBCUTANEOUS
Qty: 1 ML | Refills: 10 | Status: SHIPPED | OUTPATIENT
Start: 2023-02-09 | End: 2023-06-07

## 2023-02-09 ASSESSMENT — FIBROSIS 4 INDEX: FIB4 SCORE: 0.27

## 2023-02-09 NOTE — ASSESSMENT & PLAN NOTE
This is a chronic condition. She has a long standing history of migraines. Her migraines started around age 6. She has a family history of migraines on both sides of the family. She does get nausea, photosensitivity, and auras. She gets her headaches on both sides.      Patient has been using advil and sinus medication to help with her migraines.  She reports that her migraines have slightly improved since her sinus infection was treated.    Patient reports that she has not been able to get the MRI done as she has not met her deductible and this is quite expensive.  Discussed with patient that MRI is not urgent and she can schedule the MRI when she meets her deductible.     Will start emgality monthly. Will provide with loading dose then 120 mg monthly. Will provide with nurtec 75mg ODT for abortive therapy.

## 2023-03-01 ENCOUNTER — DOCUMENTATION (OUTPATIENT)
Dept: MEDICAL GROUP | Facility: PHYSICIAN GROUP | Age: 33
End: 2023-03-01
Payer: COMMERCIAL

## 2023-03-01 ENCOUNTER — TELEPHONE (OUTPATIENT)
Dept: MEDICAL GROUP | Facility: PHYSICIAN GROUP | Age: 33
End: 2023-03-01
Payer: COMMERCIAL

## 2023-03-01 NOTE — TELEPHONE ENCOUNTER
Was able to start the Pa for Emgality but response form cover my meds was patient not found so I called cover my meds to see if they could help they mentioned that it might be filed under wrong form rep was unable to find patient as well with information provide  I then called pharmacy to see if patient actually needed a pa for coverage per rep advised it did he went ahead and faxed information to me pending reviving fax to try to start a pa will try again tomorrow

## 2023-03-01 NOTE — PROGRESS NOTES
Was trying to do patient PA for Emgality but for some reson was unable to get into media to get INS will try again tomorrow

## 2023-04-06 ENCOUNTER — DOCUMENTATION (OUTPATIENT)
Dept: MEDICAL GROUP | Facility: PHYSICIAN GROUP | Age: 33
End: 2023-04-06
Payer: COMMERCIAL

## 2023-04-06 NOTE — PROGRESS NOTES
Received fax requesting form for additional information for patients PA for Emgality pen injector  forms were filled out by Mrs Hope and faxed to [employer] today. Patient was sent informed

## 2023-06-01 ENCOUNTER — APPOINTMENT (OUTPATIENT)
Dept: RADIOLOGY | Facility: IMAGING CENTER | Age: 33
End: 2023-06-01
Attending: NURSE PRACTITIONER
Payer: COMMERCIAL

## 2023-06-01 ENCOUNTER — OFFICE VISIT (OUTPATIENT)
Dept: MEDICAL GROUP | Facility: PHYSICIAN GROUP | Age: 33
End: 2023-06-01
Payer: COMMERCIAL

## 2023-06-01 VITALS
BODY MASS INDEX: 29.4 KG/M2 | HEIGHT: 67 IN | RESPIRATION RATE: 16 BRPM | OXYGEN SATURATION: 98 % | SYSTOLIC BLOOD PRESSURE: 100 MMHG | TEMPERATURE: 98 F | HEART RATE: 87 BPM | WEIGHT: 187.3 LBS | DIASTOLIC BLOOD PRESSURE: 80 MMHG

## 2023-06-01 DIAGNOSIS — S13.4XXA WHIPLASH INJURY TO NECK, INITIAL ENCOUNTER: ICD-10-CM
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
## 2023-06-01 PROCEDURE — 72050 X-RAY EXAM NECK SPINE 4/5VWS: CPT | Mod: TC | Performed by: RADIOLOGY

## 2023-06-01 PROCEDURE — 3074F SYST BP LT 130 MM HG: CPT | Performed by: NURSE PRACTITIONER

## 2023-06-01 PROCEDURE — 99213 OFFICE O/P EST LOW 20 MIN: CPT | Performed by: NURSE PRACTITIONER

## 2023-06-01 PROCEDURE — 72070 X-RAY EXAM THORAC SPINE 2VWS: CPT | Mod: TC | Performed by: RADIOLOGY

## 2023-06-01 PROCEDURE — 3079F DIAST BP 80-89 MM HG: CPT | Performed by: NURSE PRACTITIONER

## 2023-06-01 RX ORDER — DICLOFENAC SODIUM 75 MG/1
75 TABLET, DELAYED RELEASE ORAL 2 TIMES DAILY
Qty: 60 TABLET | Refills: 2 | Status: SHIPPED | OUTPATIENT
Start: 2023-06-01

## 2023-06-01 RX ORDER — CYCLOBENZAPRINE HCL 5 MG
5-10 TABLET ORAL 3 TIMES DAILY PRN
Qty: 90 TABLET | Refills: 0 | Status: SHIPPED | OUTPATIENT
Start: 2023-06-01 | End: 2023-06-07

## 2023-06-01 ASSESSMENT — FIBROSIS 4 INDEX: FIB4 SCORE: 0.27
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
## 2023-06-07 ENCOUNTER — OFFICE VISIT (OUTPATIENT)
Dept: MEDICAL GROUP | Facility: PHYSICIAN GROUP | Age: 33
End: 2023-06-07
Payer: COMMERCIAL

## 2023-06-07 VITALS
TEMPERATURE: 97.8 F | DIASTOLIC BLOOD PRESSURE: 76 MMHG | BODY MASS INDEX: 29.82 KG/M2 | OXYGEN SATURATION: 96 % | RESPIRATION RATE: 16 BRPM | HEART RATE: 98 BPM | WEIGHT: 190 LBS | HEIGHT: 67 IN | SYSTOLIC BLOOD PRESSURE: 100 MMHG

## 2023-06-07 DIAGNOSIS — S13.4XXD WHIPLASH INJURY TO NECK, SUBSEQUENT ENCOUNTER: ICD-10-CM
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
## 2023-06-07 PROBLEM — S13.4XXA WHIPLASH INJURY SYNDROME: Status: ACTIVE | Noted: 2023-06-07

## 2023-06-07 PROCEDURE — 3078F DIAST BP <80 MM HG: CPT | Performed by: NURSE PRACTITIONER

## 2023-06-07 PROCEDURE — 99214 OFFICE O/P EST MOD 30 MIN: CPT | Performed by: NURSE PRACTITIONER

## 2023-06-07 PROCEDURE — 3074F SYST BP LT 130 MM HG: CPT | Performed by: NURSE PRACTITIONER

## 2023-06-07 RX ORDER — HYDROCODONE BITARTRATE AND ACETAMINOPHEN 5; 325 MG/1; MG/1
1 TABLET ORAL EVERY 4 HOURS PRN
Qty: 30 TABLET | Refills: 0 | Status: SHIPPED | OUTPATIENT
Start: 2023-06-07 | End: 2023-06-14

## 2023-06-07 RX ORDER — TIZANIDINE 4 MG/1
4 TABLET ORAL EVERY 6 HOURS PRN
Qty: 90 TABLET | Refills: 1 | Status: SHIPPED | OUTPATIENT
Start: 2023-06-07

## 2023-06-07 ASSESSMENT — FIBROSIS 4 INDEX: FIB4 SCORE: 0.27
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
## 2023-06-07 NOTE — ASSESSMENT & PLAN NOTE
Patient reports that she is not getting any relief from the flexeril. She reports that she has not been sleeping due to the muscle pain in her neck and upper back.
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
## 2023-09-13 ENCOUNTER — APPOINTMENT (OUTPATIENT)
Dept: MEDICAL GROUP | Facility: PHYSICIAN GROUP | Age: 33
End: 2023-09-13
Payer: COMMERCIAL

## 2024-03-16 ENCOUNTER — APPOINTMENT (OUTPATIENT)
Dept: RADIOLOGY | Facility: MEDICAL CENTER | Age: 34
End: 2024-03-16
Attending: EMERGENCY MEDICINE
Payer: COMMERCIAL

## 2024-03-16 ENCOUNTER — HOSPITAL ENCOUNTER (EMERGENCY)
Facility: MEDICAL CENTER | Age: 34
End: 2024-03-16
Attending: EMERGENCY MEDICINE
Payer: COMMERCIAL

## 2024-03-16 VITALS
WEIGHT: 170 LBS | DIASTOLIC BLOOD PRESSURE: 83 MMHG | BODY MASS INDEX: 26.68 KG/M2 | SYSTOLIC BLOOD PRESSURE: 122 MMHG | OXYGEN SATURATION: 95 % | TEMPERATURE: 97.8 F | HEIGHT: 67 IN | RESPIRATION RATE: 20 BRPM | HEART RATE: 96 BPM

## 2024-03-16 DIAGNOSIS — T14.8XXA MUSCLE STRAIN: ICD-10-CM
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
## 2024-03-16 PROCEDURE — 71045 X-RAY EXAM CHEST 1 VIEW: CPT

## 2024-03-16 PROCEDURE — 70450 CT HEAD/BRAIN W/O DYE: CPT

## 2024-03-16 PROCEDURE — 73560 X-RAY EXAM OF KNEE 1 OR 2: CPT | Mod: LT

## 2024-03-16 PROCEDURE — 72125 CT NECK SPINE W/O DYE: CPT

## 2024-03-16 PROCEDURE — 307740 HCHG GREEN TRAUMA TEAM SERVICES

## 2024-03-16 PROCEDURE — 99284 EMERGENCY DEPT VISIT MOD MDM: CPT

## 2024-03-16 ASSESSMENT — FIBROSIS 4 INDEX: FIB4 SCORE: 0.28

## 2024-03-16 ASSESSMENT — PAIN DESCRIPTION - PAIN TYPE: TYPE: ACUTE PAIN
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
## 2024-03-17 NOTE — ED NOTES
Pt d/c home ambulatory  Pt given d/c instructions and signed d/c paper. Pt educated on follow up plan and medication usage, pt verbalized understanding of d/c instructions. Pt has all belongings at d/c. Ambulatory with no issues.

## 2024-04-03 ENCOUNTER — APPOINTMENT (OUTPATIENT)
Dept: MEDICAL GROUP | Facility: PHYSICIAN GROUP | Age: 34
End: 2024-04-03
Payer: COMMERCIAL

## 2024-04-12 RX ORDER — RIMEGEPANT SULFATE 75 MG/75MG
TABLET, ORALLY DISINTEGRATING ORAL
Qty: 8 TABLET | Refills: 5 | Status: SHIPPED | OUTPATIENT
Start: 2024-04-12

## 2024-04-12 NOTE — TELEPHONE ENCOUNTER
Received request via: Patient    Was the patient seen in the last year in this department? Yes    Does the patient have an active prescription (recently filled or refills available) for medication(s) requested? No    Pharmacy Name: [facility]    Does the patient have long term Plus and need 100 day supply (blood pressure, diabetes and cholesterol meds only)? Patient does not have SCP

## 2024-04-22 ENCOUNTER — OFFICE VISIT (OUTPATIENT)
Dept: URGENT CARE | Facility: PHYSICIAN GROUP | Age: 34
End: 2024-04-22
Payer: COMMERCIAL

## 2024-04-22 VITALS
BODY MASS INDEX: 27 KG/M2 | OXYGEN SATURATION: 98 % | SYSTOLIC BLOOD PRESSURE: 106 MMHG | HEIGHT: 67 IN | HEART RATE: 80 BPM | DIASTOLIC BLOOD PRESSURE: 63 MMHG | RESPIRATION RATE: 18 BRPM | TEMPERATURE: 97.2 F | WEIGHT: 172 LBS

## 2024-04-22 DIAGNOSIS — R10.9 ABDOMINAL PAIN, UNSPECIFIED ABDOMINAL LOCATION: ICD-10-CM

## 2024-04-22 DIAGNOSIS — R14.0 ABDOMINAL DISTENSION: ICD-10-CM

## 2024-04-22 LAB
APPEARANCE UR: CLEAR
BILIRUB UR STRIP-MCNC: NEGATIVE MG/DL
COLOR UR AUTO: YELLOW
GLUCOSE UR STRIP.AUTO-MCNC: NEGATIVE MG/DL
KETONES UR STRIP.AUTO-MCNC: NEGATIVE MG/DL
LEUKOCYTE ESTERASE UR QL STRIP.AUTO: NEGATIVE
NITRITE UR QL STRIP.AUTO: NEGATIVE
PH UR STRIP.AUTO: 7 [PH] (ref 5–8)
POCT INT CON NEG: NEGATIVE
POCT INT CON POS: POSITIVE
POCT URINE PREGNANCY TEST: NEGATIVE
PROT UR QL STRIP: NEGATIVE MG/DL
RBC UR QL AUTO: NEGATIVE
SP GR UR STRIP.AUTO: 1.02
UROBILINOGEN UR STRIP-MCNC: 0.2 MG/DL

## 2024-04-22 PROCEDURE — 3078F DIAST BP <80 MM HG: CPT | Performed by: NURSE PRACTITIONER

## 2024-04-22 PROCEDURE — 81025 URINE PREGNANCY TEST: CPT | Performed by: NURSE PRACTITIONER

## 2024-04-22 PROCEDURE — 99214 OFFICE O/P EST MOD 30 MIN: CPT | Performed by: NURSE PRACTITIONER

## 2024-04-22 PROCEDURE — 81002 URINALYSIS NONAUTO W/O SCOPE: CPT | Performed by: NURSE PRACTITIONER

## 2024-04-22 PROCEDURE — 3074F SYST BP LT 130 MM HG: CPT | Performed by: NURSE PRACTITIONER

## 2024-04-22 RX ORDER — CLARITHROMYCIN 500 MG/1
TABLET, COATED ORAL
COMMUNITY
Start: 2024-02-15 | End: 2024-04-22

## 2024-04-22 RX ORDER — DOXYCYCLINE HYCLATE 100 MG/1
CAPSULE ORAL
COMMUNITY
End: 2024-04-22

## 2024-04-22 RX ORDER — AMOXICILLIN AND CLAVULANATE POTASSIUM 875; 125 MG/1; MG/1
TABLET, FILM COATED ORAL
COMMUNITY
End: 2024-04-22

## 2024-04-22 ASSESSMENT — ENCOUNTER SYMPTOMS
VOMITING: 1
BACK PAIN: 0
NAUSEA: 1
FLANK PAIN: 0
DIARRHEA: 1
RESPIRATORY NEGATIVE: 1
CARDIOVASCULAR NEGATIVE: 1
CONSTIPATION: 1
CONSTITUTIONAL NEGATIVE: 1
FEVER: 0
ABDOMINAL PAIN: 1
CHILLS: 0

## 2024-04-22 ASSESSMENT — VISUAL ACUITY: OU: 1

## 2024-04-22 ASSESSMENT — FIBROSIS 4 INDEX: FIB4 SCORE: 0.28
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
## 2024-04-23 ENCOUNTER — HOSPITAL ENCOUNTER (OUTPATIENT)
Dept: RADIOLOGY | Facility: MEDICAL CENTER | Age: 34
End: 2024-04-23
Attending: NURSE PRACTITIONER
Payer: COMMERCIAL

## 2024-04-23 ENCOUNTER — TELEPHONE (OUTPATIENT)
Dept: URGENT CARE | Facility: PHYSICIAN GROUP | Age: 34
End: 2024-04-23
Payer: COMMERCIAL

## 2024-04-23 ENCOUNTER — HOSPITAL ENCOUNTER (OUTPATIENT)
Dept: LAB | Facility: MEDICAL CENTER | Age: 34
End: 2024-04-23
Attending: NURSE PRACTITIONER
Payer: COMMERCIAL

## 2024-04-23 DIAGNOSIS — R10.9 ABDOMINAL PAIN, UNSPECIFIED ABDOMINAL LOCATION: ICD-10-CM

## 2024-04-23 DIAGNOSIS — R14.0 ABDOMINAL DISTENSION: ICD-10-CM

## 2024-04-23 DIAGNOSIS — R11.2 NAUSEA AND VOMITING, UNSPECIFIED VOMITING TYPE: ICD-10-CM

## 2024-04-23 DIAGNOSIS — R19.7 DIARRHEA, UNSPECIFIED TYPE: ICD-10-CM

## 2024-04-23 DIAGNOSIS — K59.00 CONSTIPATION, UNSPECIFIED CONSTIPATION TYPE: ICD-10-CM

## 2024-04-23 LAB
ALBUMIN SERPL BCP-MCNC: 4.1 G/DL (ref 3.2–4.9)
ALBUMIN/GLOB SERPL: 1.5 G/DL
ALP SERPL-CCNC: 96 U/L (ref 30–99)
ALT SERPL-CCNC: 20 U/L (ref 2–50)
ANION GAP SERPL CALC-SCNC: 11 MMOL/L (ref 7–16)
AST SERPL-CCNC: 18 U/L (ref 12–45)
BASOPHILS # BLD AUTO: 0.5 % (ref 0–1.8)
BASOPHILS # BLD: 0.03 K/UL (ref 0–0.12)
BILIRUB SERPL-MCNC: 0.2 MG/DL (ref 0.1–1.5)
BUN SERPL-MCNC: 9 MG/DL (ref 8–22)
CALCIUM ALBUM COR SERPL-MCNC: 8.8 MG/DL (ref 8.5–10.5)
CALCIUM SERPL-MCNC: 8.9 MG/DL (ref 8.5–10.5)
CHLORIDE SERPL-SCNC: 103 MMOL/L (ref 96–112)
CO2 SERPL-SCNC: 22 MMOL/L (ref 20–33)
CREAT SERPL-MCNC: 0.99 MG/DL (ref 0.5–1.4)
EOSINOPHIL # BLD AUTO: 0.1 K/UL (ref 0–0.51)
EOSINOPHIL NFR BLD: 1.5 % (ref 0–6.9)
ERYTHROCYTE [DISTWIDTH] IN BLOOD BY AUTOMATED COUNT: 37.8 FL (ref 35.9–50)
GFR SERPLBLD CREATININE-BSD FMLA CKD-EPI: 77 ML/MIN/1.73 M 2
GLOBULIN SER CALC-MCNC: 2.8 G/DL (ref 1.9–3.5)
GLUCOSE SERPL-MCNC: 94 MG/DL (ref 65–99)
HCT VFR BLD AUTO: 41.6 % (ref 37–47)
HGB BLD-MCNC: 14.1 G/DL (ref 12–16)
IMM GRANULOCYTES # BLD AUTO: 0.02 K/UL (ref 0–0.11)
IMM GRANULOCYTES NFR BLD AUTO: 0.3 % (ref 0–0.9)
LIPASE SERPL-CCNC: 23 U/L (ref 11–82)
LYMPHOCYTES # BLD AUTO: 1.78 K/UL (ref 1–4.8)
LYMPHOCYTES NFR BLD: 27.4 % (ref 22–41)
MCH RBC QN AUTO: 29.1 PG (ref 27–33)
MCHC RBC AUTO-ENTMCNC: 33.9 G/DL (ref 32.2–35.5)
MCV RBC AUTO: 86 FL (ref 81.4–97.8)
MONOCYTES # BLD AUTO: 0.43 K/UL (ref 0–0.85)
MONOCYTES NFR BLD AUTO: 6.6 % (ref 0–13.4)
NEUTROPHILS # BLD AUTO: 4.13 K/UL (ref 1.82–7.42)
NEUTROPHILS NFR BLD: 63.7 % (ref 44–72)
NRBC # BLD AUTO: 0 K/UL
NRBC BLD-RTO: 0 /100 WBC (ref 0–0.2)
PLATELET # BLD AUTO: 359 K/UL (ref 164–446)
PMV BLD AUTO: 9.5 FL (ref 9–12.9)
POTASSIUM SERPL-SCNC: 4.4 MMOL/L (ref 3.6–5.5)
PROT SERPL-MCNC: 6.9 G/DL (ref 6–8.2)
RBC # BLD AUTO: 4.84 M/UL (ref 4.2–5.4)
SODIUM SERPL-SCNC: 136 MMOL/L (ref 135–145)
WBC # BLD AUTO: 6.5 K/UL (ref 4.8–10.8)

## 2024-04-23 PROCEDURE — 85025 COMPLETE CBC W/AUTO DIFF WBC: CPT

## 2024-04-23 PROCEDURE — 36415 COLL VENOUS BLD VENIPUNCTURE: CPT

## 2024-04-23 PROCEDURE — 80053 COMPREHEN METABOLIC PANEL: CPT

## 2024-04-23 PROCEDURE — 700117 HCHG RX CONTRAST REV CODE 255: Performed by: NURSE PRACTITIONER

## 2024-04-23 PROCEDURE — 74177 CT ABD & PELVIS W/CONTRAST: CPT

## 2024-04-23 PROCEDURE — 83690 ASSAY OF LIPASE: CPT

## 2024-04-23 RX ORDER — POLYETHYLENE GLYCOL 3350 17 G/17G
POWDER, FOR SOLUTION ORAL
Qty: 510 G | Refills: 0 | Status: SHIPPED | OUTPATIENT
Start: 2024-04-23

## 2024-04-23 RX ADMIN — IOHEXOL 100 ML: 350 INJECTION, SOLUTION INTRAVENOUS at 08:30
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
## 2024-04-25 ENCOUNTER — PATIENT MESSAGE (OUTPATIENT)
Dept: MEDICAL GROUP | Facility: PHYSICIAN GROUP | Age: 34
End: 2024-04-25
Payer: COMMERCIAL

## 2024-04-25 DIAGNOSIS — Z78.9 RECENT FOREIGN TRAVEL: ICD-10-CM

## 2024-04-25 DIAGNOSIS — R10.84 GENERALIZED ABDOMINAL PAIN: ICD-10-CM

## 2024-04-25 DIAGNOSIS — R19.5 LOOSE STOOLS: ICD-10-CM

## 2024-04-26 ENCOUNTER — HOSPITAL ENCOUNTER (OUTPATIENT)
Facility: MEDICAL CENTER | Age: 34
End: 2024-04-26
Attending: NURSE PRACTITIONER
Payer: COMMERCIAL

## 2024-04-26 DIAGNOSIS — R19.5 LOOSE STOOLS: ICD-10-CM

## 2024-04-26 DIAGNOSIS — Z78.9 RECENT FOREIGN TRAVEL: ICD-10-CM

## 2024-04-26 DIAGNOSIS — R10.84 GENERALIZED ABDOMINAL PAIN: ICD-10-CM

## 2024-04-26 PROCEDURE — 87899 AGENT NOS ASSAY W/OPTIC: CPT

## 2024-04-26 PROCEDURE — 87209 SMEAR COMPLEX STAIN: CPT

## 2024-04-26 PROCEDURE — 87177 OVA AND PARASITES SMEARS: CPT

## 2024-04-26 PROCEDURE — 87045 FECES CULTURE AEROBIC BACT: CPT

## 2024-04-26 PROCEDURE — 87046 STOOL CULTR AEROBIC BACT EA: CPT

## 2024-04-27 LAB
E COLI SXT1+2 STL IA: NORMAL
SIGNIFICANT IND 70042: NORMAL
SITE SITE: NORMAL
SOURCE SOURCE: NORMAL

## 2024-04-28 LAB — OVA AND PARASITE, FECAL INTERPRETATION Q0595: NEGATIVE

## 2024-04-29 LAB
BACTERIA STL CULT: NORMAL
E COLI SXT1+2 STL IA: NORMAL
SIGNIFICANT IND 70042: NORMAL
SITE SITE: NORMAL
SOURCE SOURCE: NORMAL

## 2024-10-04 RX ORDER — RIMEGEPANT SULFATE 75 MG/75MG
TABLET, ORALLY DISINTEGRATING ORAL
Qty: 8 TABLET | Refills: 0 | Status: SHIPPED | OUTPATIENT
Start: 2024-10-04

## 2024-10-18 ENCOUNTER — APPOINTMENT (OUTPATIENT)
Dept: MEDICAL GROUP | Facility: PHYSICIAN GROUP | Age: 34
End: 2024-10-18
Payer: COMMERCIAL

## 2024-12-06 NOTE — ANESTHESIA POSTPROCEDURE EVALUATION
Patient: Hayley Cobos    Procedure Summary     Date: 12/06/21 Room / Location: [facility] ROOM 23 / SURGERY SAME DAY [facility]    Anesthesia Start: 1326 Anesthesia Stop: 1530    Procedures:       HYSTERECTOMY, TOTAL, VAGINAL, LAPAROSCOPY-ASSISTED (N/A Uterus)      SALPINGECTOMY (Bilateral Fallopian Tube)      CYSTOSCOPY (N/A Bladder) Diagnosis: (ABNORMAL UTERINE BLEEDING, CHRONIC PELVIC PAIN, ENDOMETRIOSIS)    Surgeons: Alireza Slaughter M.D. Responsible Provider: Vignesh Rey M.D.    Anesthesia Type: general ASA Status: 1          Final Anesthesia Type: general  Last vitals  BP   Blood Pressure: 121/84    Temp   (P) 36.7 °C (98 °F)    Pulse   (P) 77   Resp   (P) 16    SpO2   97 %      Anesthesia Post Evaluation    Patient location during evaluation: PACU  Patient participation: complete - patient participated  Level of consciousness: awake and alert  Pain score: 3    Airway patency: patent  Anesthetic complications: no  Cardiovascular status: hemodynamically stable  Respiratory status: acceptable  Hydration status: euvolemic    PONV: none          No complications documented.                Name: Bhupinder Tapia MRN: [id number]  SSN: [id number]    YOB: 2000  Age: 24 y.o.  Sex: female      Admit Date: 2024    Discharge Date: 2024     Admitting Physician: Nafisa Urbina MD     Attending Physician:  Joycelyn Barnett,*     Admission Diagnoses: Post term pregnancy over 40 weeks [O48.0]    Discharge Diagnoses:   Information for the patient's :  Alan Tapia [[id number]]   @[phone]@     Additional Diagnoses:  No components found for: \"PCTABR\", \"OBEXTABSCRN\", \"OBEXTRUBELLA\", \"OBEXTGRBS\"    Immunization(s): There is no immunization history for the selected administration types on file for this patient.     Hospital Course: Normal hospital course following the delivery.    Patient Instructions:   Current Discharge Medication List        START taking these medications    Details   ibuprofen (ADVIL;MOTRIN) 600 MG tablet Take 1 tablet by mouth 3 times daily as needed for Pain  Qty: 30 tablet, Refills: 0      oxyCODONE (ROXICODONE) 5 MG immediate release tablet Take 1 tablet by mouth every 6 hours as needed for Pain for up to 3 days. Intended supply: 3 days. Take lowest dose possible to manage pain Max Daily Amount: 20 mg  Qty: 15 tablet, Refills: 0    Comments: Reduce doses taken as pain becomes manageable  Associated Diagnoses: Post-op pain           STOP taking these medications       Prenatal Vit-DSS-Fe Cbn-FA (PRENATAL AD PO) Comments:   Reason for Stopping:         ferrous sulfate (IRON 325) 325 (65 Fe) MG tablet Comments:   Reason for Stopping:         ferrous sulfate (IRON 325) 325 (65 Fe) MG tablet Comments:   Reason for Stopping:         ondansetron (ZOFRAN-ODT) 4 MG disintegrating tablet Comments:   Reason for Stopping:         famotidine (PEPCID) 20 MG tablet Comments:   Reason for Stopping:         doxylamine-pyridoxine 10-10 MG TBEC Comments:   Reason for Stopping:         aluminum & magnesium hydroxide-simethicone (MAALOX) 200-200-20 MG/5ML SUSP

## 2025-01-10 ENCOUNTER — APPOINTMENT (OUTPATIENT)
Dept: MEDICAL GROUP | Facility: PHYSICIAN GROUP | Age: 35
End: 2025-01-10
Payer: COMMERCIAL

## 2025-01-28 ENCOUNTER — PATIENT MESSAGE (OUTPATIENT)
Dept: MEDICAL GROUP | Facility: PHYSICIAN GROUP | Age: 35
End: 2025-01-28
Payer: COMMERCIAL

## 2025-01-28 RX ORDER — RIMEGEPANT SULFATE 75 MG/75MG
75 TABLET, ORALLY DISINTEGRATING ORAL DAILY
Qty: 8 TABLET | Refills: 0 | Status: SHIPPED | OUTPATIENT
Start: 2025-01-28

## 2025-01-28 NOTE — PATIENT COMMUNICATION
Received request via: Patient    Was the patient seen in the last year in this department? No    Does the patient have an active prescription (recently filled or refills available) for medication(s) requested? No    Pharmacy Name: [facility]    Does the patient have [facility] Plus and need 100-day supply? (This applies to ALL medications) Patient does not have SCP

## 2025-01-29 ENCOUNTER — APPOINTMENT (OUTPATIENT)
Dept: MEDICAL GROUP | Facility: PHYSICIAN GROUP | Age: 35
End: 2025-01-29
Payer: COMMERCIAL

## 2025-02-12 ENCOUNTER — PATIENT MESSAGE (OUTPATIENT)
Dept: MEDICAL GROUP | Facility: PHYSICIAN GROUP | Age: 35
End: 2025-02-12
Payer: COMMERCIAL

## 2025-02-18 ENCOUNTER — APPOINTMENT (OUTPATIENT)
Dept: MEDICAL GROUP | Facility: PHYSICIAN GROUP | Age: 35
End: 2025-02-18
Payer: COMMERCIAL

## 2025-03-14 ENCOUNTER — TELEPHONE (OUTPATIENT)
Dept: MEDICAL GROUP | Facility: PHYSICIAN GROUP | Age: 35
End: 2025-03-14
Payer: COMMERCIAL

## 2025-03-14 NOTE — TELEPHONE ENCOUNTER
Received request via: Patient    Was the patient seen in the last year in this department? No    Does the patient have an active prescription (recently filled or refills available) for medication(s) requested? No    Pharmacy Name: [facility]    Does the patient have skilled nursing Plus and need 100-day supply? (This applies to ALL medications) Patient does not have SCP

## 2025-03-14 NOTE — TELEPHONE ENCOUNTER
Pt called to ask if she may have a refikll of the migraine meds until her appt with you since she has been canceled on so many times and needs to be seen for the migraines along with other reasons. Please advise, thank you.

## 2025-03-16 RX ORDER — RIMEGEPANT SULFATE 75 MG/75MG
TABLET, ORALLY DISINTEGRATING ORAL
Qty: 8 TABLET | Refills: 0 | Status: SHIPPED | OUTPATIENT
Start: 2025-03-16

## 2025-04-01 ENCOUNTER — APPOINTMENT (OUTPATIENT)
Dept: MEDICAL GROUP | Facility: PHYSICIAN GROUP | Age: 35
End: 2025-04-01
Payer: COMMERCIAL

## 2025-04-01 VITALS
WEIGHT: 193 LBS | BODY MASS INDEX: 30.29 KG/M2 | SYSTOLIC BLOOD PRESSURE: 110 MMHG | DIASTOLIC BLOOD PRESSURE: 70 MMHG | RESPIRATION RATE: 16 BRPM | HEIGHT: 67 IN | TEMPERATURE: 97.8 F | HEART RATE: 78 BPM | OXYGEN SATURATION: 98 %

## 2025-04-01 DIAGNOSIS — G43.019 INTRACTABLE MIGRAINE WITHOUT AURA AND WITHOUT STATUS MIGRAINOSUS: ICD-10-CM

## 2025-04-01 DIAGNOSIS — Z91.89 ENCOUNTER FOR HEPATITIS C VIRUS SCREENING TEST FOR HIGH RISK PATIENT: ICD-10-CM

## 2025-04-01 DIAGNOSIS — F41.1 GENERALIZED ANXIETY DISORDER: ICD-10-CM

## 2025-04-01 DIAGNOSIS — R10.84 GENERALIZED ABDOMINAL PAIN: ICD-10-CM

## 2025-04-01 DIAGNOSIS — R19.5 LOOSE STOOLS: ICD-10-CM

## 2025-04-01 DIAGNOSIS — E55.9 VITAMIN D DEFICIENCY: ICD-10-CM

## 2025-04-01 DIAGNOSIS — Z13.29 SCREENING FOR THYROID DISORDER: ICD-10-CM

## 2025-04-01 DIAGNOSIS — Z23 NEED FOR VACCINATION: ICD-10-CM

## 2025-04-01 DIAGNOSIS — Z83.49 FAMILY HISTORY OF HASHIMOTO THYROIDITIS: ICD-10-CM

## 2025-04-01 DIAGNOSIS — F32.A DEPRESSION, UNSPECIFIED DEPRESSION TYPE: ICD-10-CM

## 2025-04-01 DIAGNOSIS — R11.0 CHRONIC NAUSEA: ICD-10-CM

## 2025-04-01 DIAGNOSIS — R14.0 POSTPRANDIAL ABDOMINAL BLOATING: ICD-10-CM

## 2025-04-01 DIAGNOSIS — Z11.59 ENCOUNTER FOR HEPATITIS C VIRUS SCREENING TEST FOR HIGH RISK PATIENT: ICD-10-CM

## 2025-04-01 DIAGNOSIS — Z11.4 ENCOUNTER FOR SCREENING FOR HIV: ICD-10-CM

## 2025-04-01 PROBLEM — S13.4XXA WHIPLASH INJURY SYNDROME: Status: RESOLVED | Noted: 2023-06-07 | Resolved: 2025-04-01
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
## 2025-04-01 PROCEDURE — 99214 OFFICE O/P EST MOD 30 MIN: CPT | Mod: 25 | Performed by: NURSE PRACTITIONER

## 2025-04-01 PROCEDURE — 90677 PCV20 VACCINE IM: CPT | Performed by: NURSE PRACTITIONER

## 2025-04-01 PROCEDURE — 3078F DIAST BP <80 MM HG: CPT | Performed by: NURSE PRACTITIONER

## 2025-04-01 PROCEDURE — 90472 IMMUNIZATION ADMIN EACH ADD: CPT | Performed by: NURSE PRACTITIONER

## 2025-04-01 PROCEDURE — 90715 TDAP VACCINE 7 YRS/> IM: CPT | Performed by: NURSE PRACTITIONER

## 2025-04-01 PROCEDURE — 90471 IMMUNIZATION ADMIN: CPT | Performed by: NURSE PRACTITIONER

## 2025-04-01 PROCEDURE — 3074F SYST BP LT 130 MM HG: CPT | Performed by: NURSE PRACTITIONER

## 2025-04-01 RX ORDER — METOCLOPRAMIDE 5 MG/1
TABLET ORAL
Qty: 30 TABLET | Refills: 2 | Status: SHIPPED | OUTPATIENT
Start: 2025-04-01

## 2025-04-01 RX ORDER — RIMEGEPANT SULFATE 75 MG/75MG
75 TABLET, ORALLY DISINTEGRATING ORAL
Qty: 8 TABLET | Refills: 11 | Status: SHIPPED | OUTPATIENT
Start: 2025-04-01 | End: 2025-04-01 | Stop reason: SDUPTHER

## 2025-04-01 RX ORDER — SUMATRIPTAN 20 MG/1
1 SPRAY NASAL
Qty: 1 EACH | Refills: 5 | Status: SHIPPED | OUTPATIENT
Start: 2025-04-01

## 2025-04-01 RX ORDER — ONDANSETRON 4 MG/1
4 TABLET, ORALLY DISINTEGRATING ORAL EVERY 6 HOURS PRN
Qty: 30 TABLET | Refills: 5 | Status: SHIPPED | OUTPATIENT
Start: 2025-04-01

## 2025-04-01 RX ORDER — RIMEGEPANT SULFATE 75 MG/75MG
75 TABLET, ORALLY DISINTEGRATING ORAL
Qty: 16 TABLET | Refills: 11 | Status: SHIPPED | OUTPATIENT
Start: 2025-04-01

## 2025-04-01 RX ORDER — DESVENLAFAXINE 100 MG/1
1 TABLET, EXTENDED RELEASE ORAL DAILY
COMMUNITY
Start: 2025-03-17

## 2025-04-01 ASSESSMENT — PATIENT HEALTH QUESTIONNAIRE - PHQ9: CLINICAL INTERPRETATION OF PHQ2 SCORE: 0

## 2025-04-01 ASSESSMENT — FIBROSIS 4 INDEX: FIB4 SCORE: 0.37

## 2025-04-01 NOTE — PROGRESS NOTES
"  Chief Complaint   Patient presents with    Medication Refill    GI Problem                                                                                                                                       HPI:   Hayley presents today with the following.    History of Present Illness  Hayley Cobos, a 34-year-old female with a history of migraines since age 6, presents with ongoing migraines and gastrointestinal issues. Her chief complaints are severe migraines and abdominal discomfort.    The patient reports experiencing severe migraines over the past month, with a particularly intense episode two weeks ago lasting for two weeks straight. During this time, she had migraines every day, requiring her to take both Nurtec and sumatriptan simultaneously. She also used nasal sprays, which she describes as \"saving my life.\" The patient mentions that the auras associated with her migraines have been particularly bad. One severe migraine at work forced her to retreat to a dark room and use nasal spray, allowing her to get home but resulting in missing three days of work. She states that her usual remedies were ineffective during this two-week period.    Regarding her gastrointestinal issues, the patient reports experiencing cramping, which was previously treated with Bentyl. She attempted a gluten-free diet for 72 days and noticed significant improvements, including reduced gas and regular bowel movements. However, she has since reintroduced gluten into her diet due to difficulty maintaining the restriction. The patient notes that when she restarted eating gluten, her migraines were triggered \"really bad.\"    The patient mentions feeling tired for the past three weeks. She also reports a family history of intestinal issues in her mother and older sister, as well as thyroid cancer in her sister.    In terms of treatment adherence, the patient has been taking Pristiq, which she receives from [facility]. She has " "been using Nurtec and sumatriptan for migraine management but expresses frustration with insurance denials for migraine shots. The patient states she has tried \"every pill\" for migraines and has undergone MRIs in the past.    Recent healthcare interactions include a gastric emptying study, but the patient did not complete a recommended hydrogen methane breath test. She expresses feeling discouraged after the gastric emptying study and began self-managing her symptoms.    Medications and Supplements  - Nurtec    - Taken for migraines    - Taken with sumatriptan during severe migraine episodes  - Sumatriptan    - Taken for migraines    - Taken with Nurtec during severe migraine episodes  - Imitrex nasal spray    - Used for migraines    - Described as \"saving my life\" during severe episodes  - Bentyl    - Taken for cramping    - Helped while using it  - Pristiq    - Obtained from [facility]    Review of Systems  General: Positive for fatigue.  HEENT: Positive for migraine headaches with auras.  Gastrointestinal: Positive for bloating, gas, and irregular bowel movements.  Neurological: Positive for migraine headaches with auras.    Social History  The patient reports a history of dietary changes, including a period of 72 days on a gluten-free diet during which she felt significantly better. She mentions difficulty maintaining this diet due to the challenges of avoiding gluten. The patient has a daughter and mentions not having a relationship with her mother. She works, as evidenced by her reference to missing three days of work due to a severe migraine episode. The patient appears to have some social support, mentioning a sister and daughter, though family relationships seem complex.    ROS:  All systems negative expect as addressed in assessment and plan.     /70 (BP Location: Left arm, Patient Position: Sitting)   Pulse 78   Temp 36.6 °C (97.8 °F) (Temporal)   Resp 16   Ht 1.702 m (5' 7\")   Wt " 87.5 kg (193 lb)   LMP 12/28/2020   SpO2 98%   BMI 30.23 kg/m²     Objective:    Physical Exam  Vitals reviewed.   Constitutional:       Appearance: Normal appearance.   HENT:      Head: Normocephalic and atraumatic.      Mouth/Throat:      Mouth: Mucous membranes are moist.   Eyes:      Extraocular Movements: Extraocular movements intact.      Conjunctiva/sclera: Conjunctivae normal.   Pulmonary:      Effort: Pulmonary effort is normal.   Musculoskeletal:         General: Normal range of motion.      Cervical back: Normal range of motion.   Skin:     General: Skin is warm and dry.   Neurological:      General: No focal deficit present.      Mental Status: She is alert and oriented to person, place, and time.   Psychiatric:         Mood and Affect: Mood normal.         Behavior: Behavior normal.         Thought Content: Thought content normal.         Assessment and Plan:  34 y.o. female with the following issues.    1. Intractable migraine without aura and without status migrainosus  - Comp Metabolic Panel; Future  - TSH; Future  - FREE THYROXINE; Future  - IRON/TOTAL IRON BIND; Future  - FERRITIN; Future  - VITAMIN B12; Future  - CBC WITHOUT DIFFERENTIAL; Future  - Sed Rate; Future  - CRP QUANTITIVE (NON-CARDIAC); Future  - sumatriptan (IMITREX) 20 MG/ACT nasal spray; Administer 1 Spray into affected nostril(S) 1 time a day as needed for Migraine.  Dispense: 1 Each; Refill: 5  - Rimegepant Sulfate (NURTEC) 75 MG TABLET DISPERSIBLE; Take 1 Tablet by mouth every 48 hours as needed (migraine).  Dispense: 16 Tablet; Refill: 11  - ondansetron (ZOFRAN ODT) 4 MG TABLET DISPERSIBLE; Take 1 Tablet by mouth every 6 hours as needed for Nausea/Vomiting.  Dispense: 30 Tablet; Refill: 5  - metoclopramide (REGLAN) 5 MG tablet; Take 1-2 tablets for severe intractable migraine. Take with abortive medication and 25-50mg of benadryl. Only use once in 24 hour period.  Dispense: 30 Tablet; Refill: 2    2. Generalized abdominal  pain  - CELIAC DISEASE AB SCREEN W/RFX  - Comp Metabolic Panel; Future  - IRON/TOTAL IRON BIND; Future  - FERRITIN; Future  - VITAMIN B12; Future  - CBC WITHOUT DIFFERENTIAL; Future  - Sed Rate; Future  - CRP QUANTITIVE (NON-CARDIAC); Future  - CALPROTECTIN,FECAL; Future    3. Loose stools  - CELIAC DISEASE AB SCREEN W/RFX  - Comp Metabolic Panel; Future  - IRON/TOTAL IRON BIND; Future  - FERRITIN; Future  - VITAMIN B12; Future  - CBC WITHOUT DIFFERENTIAL; Future  - Sed Rate; Future  - CRP QUANTITIVE (NON-CARDIAC); Future  - CALPROTECTIN,FECAL; Future    4. Postprandial abdominal bloating  - CELIAC DISEASE AB SCREEN W/RFX  - Comp Metabolic Panel; Future  - IRON/TOTAL IRON BIND; Future  - FERRITIN; Future  - VITAMIN B12; Future  - CBC WITHOUT DIFFERENTIAL; Future  - Sed Rate; Future  - CRP QUANTITIVE (NON-CARDIAC); Future  - CALPROTECTIN,FECAL; Future    5. Chronic nausea  - CELIAC DISEASE AB SCREEN W/RFX  - Comp Metabolic Panel; Future  - IRON/TOTAL IRON BIND; Future  - FERRITIN; Future  - VITAMIN B12; Future  - CBC WITHOUT DIFFERENTIAL; Future  - Sed Rate; Future  - CRP QUANTITIVE (NON-CARDIAC); Future  - CALPROTECTIN,FECAL; Future    6. Need for vaccination  - Tdap Vaccine =>6YO IM  - Pneumococcal Conjugate Vaccine 20-Valent (6 wks+)    7. Screening for thyroid disorder  - FREE THYROXINE; Future  - THYROID PEROXIDASE  (TPO) AB; Future    8. Family history of Hashimoto thyroiditis  - TSH; Future  - FREE THYROXINE; Future  - THYROID PEROXIDASE  (TPO) AB; Future    9. Encounter for screening for HIV  - HIV AG/AB COMBO ASSAY SCREENING; Future    10. Encounter for hepatitis C virus screening test for high risk patient  - HEP C VIRUS ANTIBODY; Future    11. Vitamin D deficiency  - VITAMIN D,25 HYDROXY (DEFICIENCY); Future    12. Generalized anxiety disorder  - Desvenlafaxine Succinate 100 MG TABLET SR 24 HR; Take 1 Tablet by mouth every day.    13. Depression, unspecified depression type  - Desvenlafaxine Succinate 100  MG TABLET SR 24 HR; Take 1 Tablet by mouth every day.    Assessment & Plan    -Chronic migraines     -Prescribed Nurtec as preventative, to be taken every 48 hours     -Ordered migraine cocktail medication (metoclopramide) for severe intractable migraines     -Prescribed Zofran for nausea     -Follow up in one month to reassess migraine frequency and severity    -Gastrointestinal issues (possible Celiac disease or gluten intolerance)     -Ordered lab work including Celiac antibody screening, inflammatory markers, iron levels, B12 levels, anemia check, kidney and liver function tests     -Ordered stool studies to check for inflammatory bowel conditions     -Advised patient to continue eating gluten until workup is completed     -Follow up in one month to review lab results and discuss further management    -Thyroid screening     -Included thyroid function tests in lab work due to family history    -Medication management     -Refilled Nurtec     -Confirmed Pristiq is managed by [facility]    -Follow-up plan     -Scheduled one-month follow-up appointment to review lab results, assess migraine status, and evaluate gastrointestinal symptoms    Return in about 1 month (around 5/1/2025) for Lab Review.      Please note that this dictation was created using voice recognition software. I have worked with consultants from the vendor as well as technical experts from [facility] [employer] to optimize the interface. I have made every reasonable attempt to correct obvious errors, but I expect that there are errors of grammar and possibly content that I did not discover before finalizing the note.

## 2025-04-02 ENCOUNTER — HOSPITAL ENCOUNTER (OUTPATIENT)
Dept: LAB | Facility: MEDICAL CENTER | Age: 35
End: 2025-04-02
Payer: COMMERCIAL

## 2025-04-02 DIAGNOSIS — R10.84 GENERALIZED ABDOMINAL PAIN: ICD-10-CM

## 2025-04-02 DIAGNOSIS — R19.5 LOOSE STOOLS: ICD-10-CM

## 2025-04-02 DIAGNOSIS — Z13.29 SCREENING FOR THYROID DISORDER: ICD-10-CM

## 2025-04-02 DIAGNOSIS — E55.9 VITAMIN D DEFICIENCY: ICD-10-CM

## 2025-04-02 DIAGNOSIS — R11.0 CHRONIC NAUSEA: ICD-10-CM

## 2025-04-02 DIAGNOSIS — Z91.89 ENCOUNTER FOR HEPATITIS C VIRUS SCREENING TEST FOR HIGH RISK PATIENT: ICD-10-CM

## 2025-04-02 DIAGNOSIS — R14.0 POSTPRANDIAL ABDOMINAL BLOATING: ICD-10-CM

## 2025-04-02 DIAGNOSIS — G43.019 INTRACTABLE MIGRAINE WITHOUT AURA AND WITHOUT STATUS MIGRAINOSUS: ICD-10-CM

## 2025-04-02 DIAGNOSIS — Z11.4 ENCOUNTER FOR SCREENING FOR HIV: ICD-10-CM

## 2025-04-02 DIAGNOSIS — Z11.59 ENCOUNTER FOR HEPATITIS C VIRUS SCREENING TEST FOR HIGH RISK PATIENT: ICD-10-CM

## 2025-04-02 DIAGNOSIS — Z83.49 FAMILY HISTORY OF HASHIMOTO THYROIDITIS: ICD-10-CM

## 2025-04-02 LAB
25(OH)D3 SERPL-MCNC: 23 NG/ML (ref 30–100)
ALBUMIN SERPL BCP-MCNC: 4.2 G/DL (ref 3.2–4.9)
ALBUMIN/GLOB SERPL: 1.3 G/DL
ALP SERPL-CCNC: 95 U/L (ref 30–99)
ALT SERPL-CCNC: 27 U/L (ref 2–50)
ANION GAP SERPL CALC-SCNC: 10 MMOL/L (ref 7–16)
AST SERPL-CCNC: 24 U/L (ref 12–45)
BILIRUB SERPL-MCNC: 0.3 MG/DL (ref 0.1–1.5)
BUN SERPL-MCNC: 12 MG/DL (ref 8–22)
CALCIUM ALBUM COR SERPL-MCNC: 9.1 MG/DL (ref 8.5–10.5)
CALCIUM SERPL-MCNC: 9.3 MG/DL (ref 8.5–10.5)
CHLORIDE SERPL-SCNC: 103 MMOL/L (ref 96–112)
CO2 SERPL-SCNC: 24 MMOL/L (ref 20–33)
CREAT SERPL-MCNC: 0.91 MG/DL (ref 0.5–1.4)
CRP SERPL HS-MCNC: 0.34 MG/DL (ref 0–0.75)
ERYTHROCYTE [DISTWIDTH] IN BLOOD BY AUTOMATED COUNT: 38.7 FL (ref 35.9–50)
ERYTHROCYTE [SEDIMENTATION RATE] IN BLOOD BY WESTERGREN METHOD: 12 MM/HOUR (ref 0–25)
FERRITIN SERPL-MCNC: 59.5 NG/ML (ref 10–291)
GFR SERPLBLD CREATININE-BSD FMLA CKD-EPI: 85 ML/MIN/1.73 M 2
GLOBULIN SER CALC-MCNC: 3.2 G/DL (ref 1.9–3.5)
GLUCOSE SERPL-MCNC: 97 MG/DL (ref 65–99)
HCT VFR BLD AUTO: 44.4 % (ref 37–47)
HCV AB SER QL: NORMAL
HGB BLD-MCNC: 14.4 G/DL (ref 12–16)
HIV 1+2 AB+HIV1 P24 AG SERPL QL IA: NORMAL
IRON SATN MFR SERPL: 28 % (ref 15–55)
IRON SERPL-MCNC: 85 UG/DL (ref 40–170)
MCH RBC QN AUTO: 29.3 PG (ref 27–33)
MCHC RBC AUTO-ENTMCNC: 32.4 G/DL (ref 32.2–35.5)
MCV RBC AUTO: 90.2 FL (ref 81.4–97.8)
PLATELET # BLD AUTO: 358 K/UL (ref 164–446)
PMV BLD AUTO: 9 FL (ref 9–12.9)
POTASSIUM SERPL-SCNC: 4.4 MMOL/L (ref 3.6–5.5)
PROT SERPL-MCNC: 7.4 G/DL (ref 6–8.2)
RBC # BLD AUTO: 4.92 M/UL (ref 4.2–5.4)
SODIUM SERPL-SCNC: 137 MMOL/L (ref 135–145)
T4 FREE SERPL-MCNC: 1.03 NG/DL (ref 0.93–1.7)
THYROPEROXIDASE AB SERPL-ACNC: 10 IU/ML (ref 0–9)
TIBC SERPL-MCNC: 301 UG/DL (ref 250–450)
TSH SERPL-ACNC: 0.78 UIU/ML (ref 0.35–5.5)
UIBC SERPL-MCNC: 216 UG/DL (ref 110–370)
VIT B12 SERPL-MCNC: 489 PG/ML (ref 211–911)
WBC # BLD AUTO: 7.1 K/UL (ref 4.8–10.8)

## 2025-04-02 PROCEDURE — 86376 MICROSOMAL ANTIBODY EACH: CPT

## 2025-04-02 PROCEDURE — 80053 COMPREHEN METABOLIC PANEL: CPT

## 2025-04-02 PROCEDURE — 82306 VITAMIN D 25 HYDROXY: CPT

## 2025-04-02 PROCEDURE — 85027 COMPLETE CBC AUTOMATED: CPT

## 2025-04-02 PROCEDURE — 86140 C-REACTIVE PROTEIN: CPT

## 2025-04-02 PROCEDURE — 85652 RBC SED RATE AUTOMATED: CPT

## 2025-04-02 PROCEDURE — 84439 ASSAY OF FREE THYROXINE: CPT

## 2025-04-02 PROCEDURE — 84443 ASSAY THYROID STIM HORMONE: CPT

## 2025-04-02 PROCEDURE — 36415 COLL VENOUS BLD VENIPUNCTURE: CPT

## 2025-04-02 PROCEDURE — 82607 VITAMIN B-12: CPT

## 2025-04-02 PROCEDURE — 82728 ASSAY OF FERRITIN: CPT

## 2025-04-02 PROCEDURE — 83540 ASSAY OF IRON: CPT

## 2025-04-02 PROCEDURE — 87389 HIV-1 AG W/HIV-1&-2 AB AG IA: CPT

## 2025-04-02 PROCEDURE — 86803 HEPATITIS C AB TEST: CPT

## 2025-04-02 PROCEDURE — 83550 IRON BINDING TEST: CPT

## 2025-04-12 ENCOUNTER — RESULTS FOLLOW-UP (OUTPATIENT)
Dept: MEDICAL GROUP | Facility: PHYSICIAN GROUP | Age: 35
End: 2025-04-12

## 2025-05-06 ENCOUNTER — OFFICE VISIT (OUTPATIENT)
Dept: MEDICAL GROUP | Facility: PHYSICIAN GROUP | Age: 35
End: 2025-05-06
Payer: COMMERCIAL

## 2025-05-06 VITALS
TEMPERATURE: 97 F | DIASTOLIC BLOOD PRESSURE: 72 MMHG | HEART RATE: 78 BPM | OXYGEN SATURATION: 98 % | BODY MASS INDEX: 31.08 KG/M2 | WEIGHT: 198 LBS | HEIGHT: 67 IN | RESPIRATION RATE: 16 BRPM | SYSTOLIC BLOOD PRESSURE: 108 MMHG

## 2025-05-06 DIAGNOSIS — R05.1 ACUTE COUGH: ICD-10-CM

## 2025-05-06 DIAGNOSIS — J22 LOWER RESPIRATORY TRACT INFECTION: ICD-10-CM

## 2025-05-06 DIAGNOSIS — R47.02 DYSPHASIA: ICD-10-CM

## 2025-05-06 DIAGNOSIS — R49.0 HOARSE VOICE QUALITY: ICD-10-CM

## 2025-05-06 DIAGNOSIS — R09.A2 SENSATION OF LUMP IN THROAT: ICD-10-CM

## 2025-05-06 DIAGNOSIS — R53.83 OTHER FATIGUE: ICD-10-CM

## 2025-05-06 PROCEDURE — 99214 OFFICE O/P EST MOD 30 MIN: CPT | Performed by: NURSE PRACTITIONER

## 2025-05-06 PROCEDURE — 3078F DIAST BP <80 MM HG: CPT | Performed by: NURSE PRACTITIONER

## 2025-05-06 PROCEDURE — 3074F SYST BP LT 130 MM HG: CPT | Performed by: NURSE PRACTITIONER

## 2025-05-06 RX ORDER — DEXTROMETHORPHAN HYDROBROMIDE AND PROMETHAZINE HYDROCHLORIDE 15; 6.25 MG/5ML; MG/5ML
2.5-5 SYRUP ORAL EVERY 4 HOURS PRN
Qty: 120 ML | Refills: 0 | Status: SHIPPED | OUTPATIENT
Start: 2025-05-06

## 2025-05-06 RX ORDER — BENZONATATE 100 MG/1
100-200 CAPSULE ORAL 3 TIMES DAILY PRN
Qty: 60 CAPSULE | Refills: 0 | Status: SHIPPED | OUTPATIENT
Start: 2025-05-06

## 2025-05-06 RX ORDER — AZITHROMYCIN 250 MG/1
TABLET, FILM COATED ORAL
Qty: 6 TABLET | Refills: 0 | Status: SHIPPED | OUTPATIENT
Start: 2025-05-06 | End: 2025-05-11

## 2025-05-06 ASSESSMENT — FIBROSIS 4 INDEX: FIB4 SCORE: 0.43

## 2025-05-06 NOTE — ASSESSMENT & PLAN NOTE
Reports not feeling well for the past 3 days. Developed a cough yesterday. Cough is productive with thick yellowish sputum. Her son has been sick and was just diagnosed with walking pneumonia this morning.

## 2025-05-06 NOTE — PROGRESS NOTES
Chief Complaint   Patient presents with    Lab Results    Cough     X 1 day                                                                                                                                       HPI:   Hayley presents today with the following.    Patient consented to the use of [employer] to record and transcribe notes during this visit.      The patient attended the consultation today to discuss multiple health concerns that have recently emerged, significantly impacting her well-being.    The chief complaints presented by the patient include a new cough that started last night, producing thick, greenish-yellow sputum, intermittent hoarseness which has worsened, choking on food and liquids, a sensation of a lump in the throat, and ear pain that began today.    The patient reports feeling terrible and fatigued for the past three days. She denies experiencing fever or chills. It's noted that the patient has no known antibiotic allergies. Importantly, the patient's son was recently diagnosed with walking pneumonia, which may be relevant to the current symptoms.    Regarding medications and supplements, the patient inquired about trying Reglan for headaches. She is also taking over-the-counter Vitamin D, which was recommended due to low levels.    The patient's family history includes her son's recent diagnosis of walking pneumonia. The social history indicates that the patient has a son.    A review of systems reveals fatigue, hoarse voice, ear pain, choking on food and liquids, a sensation of a lump in the throat, and a cough with thick, greenish-yellow sputum. The intermittent hoarseness, choking, and lump sensation in the throat suggest a potential thyroid issue that may require further investigation.    The patient consented to the use of [employer] to record and transcribe notes during this visit, ensuring accurate documentation of her health concerns and symptoms.        ROS:  All systems negative expect as  "addressed in assessment and plan.     /72 (BP Location: Left arm, Patient Position: Sitting)   Pulse 78   Temp 36.1 °C (97 °F) (Temporal)   Resp 16   Ht 1.702 m (5' 7\")   Wt 89.8 kg (198 lb)   LMP 12/28/2020   SpO2 98%   BMI 31.01 kg/m²     Objective:    Physical Exam  Vitals reviewed.   Constitutional:       Appearance: Normal appearance.   HENT:      Head: Normocephalic and atraumatic.      Right Ear: A middle ear effusion is present.      Left Ear: A middle ear effusion is present.      Mouth/Throat:      Mouth: Mucous membranes are moist.   Eyes:      Extraocular Movements: Extraocular movements intact.      Conjunctiva/sclera: Conjunctivae normal.   Pulmonary:      Effort: Pulmonary effort is normal.   Musculoskeletal:         General: Normal range of motion.      Cervical back: Normal range of motion.   Skin:     General: Skin is warm and dry.   Neurological:      General: No focal deficit present.      Mental Status: She is alert and oriented to person, place, and time.   Psychiatric:         Mood and Affect: Mood normal.         Behavior: Behavior normal.         Thought Content: Thought content normal.        Latest Reference Range & Units 04/02/25 08:46   WBC 4.8 - 10.8 K/uL 7.1   RBC 4.20 - 5.40 M/uL 4.92   Hemoglobin 12.0 - 16.0 g/dL 14.4   Hematocrit 37.0 - 47.0 % 44.4   MCV 81.4 - 97.8 fL 90.2   MCH 27.0 - 33.0 pg 29.3   MCHC 32.2 - 35.5 g/dL 32.4   RDW 35.9 - 50.0 fL 38.7   Platelet Count 164 - 446 K/uL 358   MPV 9.0 - 12.9 fL 9.0   Sed Rate Westergren 0 - 25 mm/hour 12   Sodium 135 - 145 mmol/L 137   Potassium 3.6 - 5.5 mmol/L 4.4   Chloride 96 - 112 mmol/L 103   Co2 20 - 33 mmol/L 24   Anion Gap 7.0 - 16.0  10.0   Glucose 65 - 99 mg/dL 97   Bun 8 - 22 mg/dL 12   Creatinine 0.50 - 1.40 mg/dL 0.91   GFR (CKD-EPI) >60 mL/min/1.73 m 2 85   Calcium 8.5 - 10.5 mg/dL 9.3   Correct Calcium 8.5 - 10.5 mg/dL 9.1   AST(SGOT) 12 - 45 U/L 24   ALT(SGPT) 2 - 50 U/L 27   Alkaline Phosphatase 30 - 99 " U/L 95   Total Bilirubin 0.1 - 1.5 mg/dL 0.3   Albumin 3.2 - 4.9 g/dL 4.2   Total Protein 6.0 - 8.2 g/dL 7.4   Globulin 1.9 - 3.5 g/dL 3.2   A-G Ratio g/dL 1.3   Iron 40 - 170 ug/dL 85   Total Iron Binding 250 - 450 ug/dL 301   % Saturation 15 - 55 % 28   Unsat Iron Binding 110 - 370 ug/dL 216   25-Hydroxy   Vitamin D 25 30 - 100 ng/mL 23 (L)   Ferritin 10.0 - 291.0 ng/mL 59.5   Vitamin B12 -True Cobalamin 211 - 911 pg/mL 489   TSH 0.350 - 5.500 uIU/mL 0.775   Free T-4 0.93 - 1.70 ng/dL 1.03   Hepatitis C Antibody Non-Reactive  Non-Reactive   HIV Ag/Ab Combo Assay Non Reactive  Non-Reactive   Stat C-Reactive Protein 0.00 - 0.75 mg/dL 0.34   Microsomal -Tpo- Abs 0.0 - 9.0 IU/mL 10.0 (H)   (L): Data is abnormally low  (H): Data is abnormally high      Assessment and Plan:  34 y.o. female with the following issues.    1. Hoarse voice quality  - US-THYROID; Future    2. Sensation of lump in throat  - US-THYROID; Future    3. Dysphasia  - US-THYROID; Future    4. Other fatigue    5. Acute cough  - promethazine-dextromethorphan (PROMETHAZINE-DM) 6.25-15 MG/5ML syrup; Take 2.5-5 mL by mouth every four hours as needed for Cough.  Dispense: 120 mL; Refill: 0  - benzonatate (TESSALON) 100 MG Cap; Take 1-2 Capsules by mouth 3 times a day as needed for Cough.  Dispense: 60 Capsule; Refill: 0    6. Lower respiratory tract infection  - promethazine-dextromethorphan (PROMETHAZINE-DM) 6.25-15 MG/5ML syrup; Take 2.5-5 mL by mouth every four hours as needed for Cough.  Dispense: 120 mL; Refill: 0  - benzonatate (TESSALON) 100 MG Cap; Take 1-2 Capsules by mouth 3 times a day as needed for Cough.  Dispense: 60 Capsule; Refill: 0  - azithromycin (ZITHROMAX) 250 MG Tab; Take 2 Tablets by mouth every day for 1 day, THEN 1 Tablet every day for 4 days.  Dispense: 6 Tablet; Refill: 0      1. Acute Bronchitis:     - Prescribe Azithromycin (Z-Chirag): 2 tablets on day 1, 1 tablet on days 2-5. Start Friday AM if she has no improvement by Thursday  PM. Take with food.     - Recommend OTC Mucinex DM for mucus and cough relief.     - Advise use of cough pearls (daytime) and cough medicine with permethrin and dextromethorphan (nighttime).     - Encourage hydration and hot showers with eucalyptus/Vicks Shower Steamers.     - Follow up if symptoms worsen or persist beyond 7-10 days.    2. Suspected Hashimoto's Thyroiditis:     - Order thyroid ultrasound to check for inflammation.     - Schedule follow-up after June 3rd for ultrasound results.     - Plan to repeat TPO antibody test next year.     - Consider low-dose thyroid medication if ultrasound is positive and symptoms persist.     - Educate patient on symptoms: dryness, rough skin, dry hair, constipation, depression, hair loss, puffy eyes, cold sensitivity, joint/muscle pain, fatigue, sleep disturbances.     - Recommend anti-inflammatory diet and supplements (fish oil, omega-3s, flax seed, turmeric, collagen).    3. Vitamin D Deficiency:     - Recommend over-the-counter vitamin D supplementation.    4. Otitis Media with Effusion (OME):     - Monitor symptoms.     - No specific treatment indicated at this time.     - Inform patient that condition may resolve spontaneously.      Return in about 1 month (around 6/6/2025) for Imaging.      Please note that this dictation was created using voice recognition software. I have worked with consultants from the vendor as well as technical experts from [employer] to optimize the interface. I have made every reasonable attempt to correct obvious errors, but I expect that there are errors of grammar and possibly content that I did not discover before finalizing the note.

## 2025-05-09 ENCOUNTER — APPOINTMENT (OUTPATIENT)
Dept: RADIOLOGY | Facility: IMAGING CENTER | Age: 35
End: 2025-05-09
Payer: COMMERCIAL

## 2025-05-09 ENCOUNTER — OFFICE VISIT (OUTPATIENT)
Dept: URGENT CARE | Facility: PHYSICIAN GROUP | Age: 35
End: 2025-05-09
Payer: COMMERCIAL

## 2025-05-09 VITALS
TEMPERATURE: 97.7 F | RESPIRATION RATE: 16 BRPM | SYSTOLIC BLOOD PRESSURE: 108 MMHG | BODY MASS INDEX: 29.76 KG/M2 | DIASTOLIC BLOOD PRESSURE: 64 MMHG | HEART RATE: 110 BPM | WEIGHT: 190 LBS | OXYGEN SATURATION: 97 %

## 2025-05-09 DIAGNOSIS — R05.1 ACUTE COUGH: ICD-10-CM

## 2025-05-09 DIAGNOSIS — J01.40 ACUTE NON-RECURRENT PANSINUSITIS: ICD-10-CM

## 2025-05-09 DIAGNOSIS — R06.2 WHEEZING: ICD-10-CM

## 2025-05-09 PROCEDURE — 3078F DIAST BP <80 MM HG: CPT

## 2025-05-09 PROCEDURE — 3074F SYST BP LT 130 MM HG: CPT

## 2025-05-09 PROCEDURE — 99214 OFFICE O/P EST MOD 30 MIN: CPT

## 2025-05-09 PROCEDURE — 71046 X-RAY EXAM CHEST 2 VIEWS: CPT | Mod: TC | Performed by: RADIOLOGY

## 2025-05-09 RX ORDER — ALBUTEROL SULFATE 90 UG/1
1-2 INHALANT RESPIRATORY (INHALATION) EVERY 6 HOURS PRN
Qty: 8.5 G | Refills: 0 | Status: SHIPPED | OUTPATIENT
Start: 2025-05-09

## 2025-05-09 RX ORDER — DOXYCYCLINE HYCLATE 100 MG
100 TABLET ORAL 2 TIMES DAILY
Qty: 14 TABLET | Refills: 0 | Status: SHIPPED | OUTPATIENT
Start: 2025-05-09 | End: 2025-05-16

## 2025-05-09 RX ORDER — PREDNISONE 20 MG/1
20 TABLET ORAL DAILY
Qty: 5 TABLET | Refills: 0 | Status: SHIPPED | OUTPATIENT
Start: 2025-05-09 | End: 2025-05-14

## 2025-05-09 ASSESSMENT — ENCOUNTER SYMPTOMS
STRIDOR: 0
CHILLS: 1
DIARRHEA: 0
EYE REDNESS: 0
WHEEZING: 1
WEAKNESS: 0
EYE PAIN: 0
SINUS PAIN: 1
NECK PAIN: 0
COUGH: 1
NAUSEA: 0
EYE DISCHARGE: 0
PALPITATIONS: 0
ABDOMINAL PAIN: 0
SORE THROAT: 1
VOMITING: 1
SHORTNESS OF BREATH: 0
HEADACHES: 1
FOCAL WEAKNESS: 0
MYALGIAS: 1
SPUTUM PRODUCTION: 1
BLURRED VISION: 0
DOUBLE VISION: 0
DIZZINESS: 0
PHOTOPHOBIA: 0

## 2025-05-09 ASSESSMENT — FIBROSIS 4 INDEX: FIB4 SCORE: 0.43

## 2025-05-09 ASSESSMENT — VISUAL ACUITY: OU: 1

## 2025-05-09 NOTE — PROGRESS NOTES
Subjective     Hayley Cobos is a 34 y.o. female who presents with Sinus Problem (Sinus congestion, dx with pneumonia, getting worse, throat and chest irritation,  chest congestion)    HPI:   Hayley is a 33yo female presenting for cough and nasal congestion x1 week. She was recently seen 5/6/25 and treated with Azithromycin for suspect pneumonia. She has been taking this antibiotic as prescribed. Reports associated sore throat, fatigue, shortness of breath with exertion, pleuritic chest pain, post-tussive emesis, and intermittent headache. Denies abdominal pain or diarrhea. Denies shortness of breath or increased work of breathing. Denies dysphagia or difficulty managing oral secretions. No rash.       Review of Systems   Constitutional:  Positive for chills and malaise/fatigue.   HENT:  Positive for congestion, ear pain, sinus pain and sore throat. Negative for ear discharge.    Eyes:  Negative for blurred vision, double vision, photophobia, pain, discharge and redness.   Respiratory:  Positive for cough, sputum production and wheezing. Negative for shortness of breath and stridor.    Cardiovascular:  Negative for palpitations and leg swelling.   Gastrointestinal:  Positive for vomiting (post-tussive). Negative for abdominal pain, diarrhea and nausea.   Musculoskeletal:  Positive for myalgias. Negative for neck pain.   Skin:  Negative for rash.   Neurological:  Positive for headaches. Negative for dizziness, focal weakness and weakness.     Past Medical History:   Diagnosis Date    Anxiety     Breast cyst, right     Depression     Endometriosis     Migraine      Past Surgical History:   Procedure Laterality Date    [place] CYSTOURETHROSCOPY N/A 12/6/2021    Procedure: CYSTOSCOPY;  Surgeon: Alireza Slaughter M.D.;  Location: SURGERY SAME DAY [facility];  Service: Gynecology    VAGINAL HYSTERECTOMY SCOPE TOTAL N/A 12/6/2021    Procedure: HYSTERECTOMY, TOTAL, VAGINAL, LAPAROSCOPY-ASSISTED;  Surgeon: Alireza Slaughter M.D.;   Location: SURGERY SAME DAY [facility];  Service: Gynecology    SALPINGECTOMY Bilateral 12/6/2021    Procedure: SALPINGECTOMY;  Surgeon: Alireza Slaughter M.D.;  Location: SURGERY SAME DAY [facility];  Service: Gynecology     Allergies: Bupropion, Latex, Topiramate, and Triptans [sumatriptan]     Current Outpatient Medications:     promethazine-dextromethorphan (PROMETHAZINE-DM) 6.25-15 MG/5ML syrup, Take 2.5-5 mL by mouth every four hours as needed for Cough., Disp: 120 mL, Rfl: 0    benzonatate (TESSALON) 100 MG Cap, Take 1-2 Capsules by mouth 3 times a day as needed for Cough., Disp: 60 Capsule, Rfl: 0    azithromycin (ZITHROMAX) 250 MG Tab, Take 2 Tablets by mouth every day for 1 day, THEN 1 Tablet every day for 4 days., Disp: 6 Tablet, Rfl: 0    Desvenlafaxine Succinate 100 MG TABLET SR 24 HR, Take 1 Tablet by mouth every day., Disp: , Rfl:     sumatriptan (IMITREX) 20 MG/ACT nasal spray, Administer 1 Spray into affected nostril(S) 1 time a day as needed for Migraine., Disp: 1 Each, Rfl: 5    Rimegepant Sulfate (NURTEC) 75 MG TABLET DISPERSIBLE, Take 1 Tablet by mouth every 48 hours as needed (migraine)., Disp: 16 Tablet, Rfl: 11    ondansetron (ZOFRAN ODT) 4 MG TABLET DISPERSIBLE, Take 1 Tablet by mouth every 6 hours as needed for Nausea/Vomiting., Disp: 30 Tablet, Rfl: 5    metoclopramide (REGLAN) 5 MG tablet, Take 1-2 tablets for severe intractable migraine. Take with abortive medication and 25-50mg of benadryl. Only use once in 24 hour period., Disp: 30 Tablet, Rfl: 2     Social History     Tobacco Use    Smoking status: Some Days     Current packs/day: 0.00     Average packs/day: 0.5 packs/day for 15.0 years (7.5 ttl pk-yrs)     Types: Electronic Cigarettes, Cigarettes     Start date: 2007     Last attempt to quit: 2022     Years since quitting: 3.3    Smokeless tobacco: Never    Tobacco comments:     occasional   Vaping Use    Vaping status: Every Day    Substances: Nicotine   Substance Use Topics    Alcohol  use: Yes     Comment: Not often    Drug use: Not Currently     Types: Inhaled     Comment: marijuana     Family History   Problem Relation Age of Onset    Hypertension Mother     Alcohol abuse Father     Drug abuse Father     Cancer Sister         Thyroid    Hyperlipidemia Maternal Grandmother     Hypertension Maternal Grandmother     Cancer Maternal Grandmother         Brain    Diabetes Maternal Grandmother     Stroke Maternal Grandmother      Medications, Allergies, and current problem list reviewed today in [employer].      Objective     /64 (BP Location: Right arm, Patient Position: Sitting, BP Cuff Size: Adult)   Pulse (!) 110   Temp 36.5 °C (97.7 °F) (Temporal)   Resp 16   Wt 86.2 kg (190 lb)   LMP 12/28/2020   SpO2 97%   BMI 29.76 kg/m²      Physical Exam  Vitals reviewed.   Constitutional:       General: She is not in acute distress.  HENT:      Head: No right periorbital erythema or left periorbital erythema.      Jaw: There is normal jaw occlusion. No tenderness, swelling, pain on movement or malocclusion.      Right Ear: Tympanic membrane, ear canal and external ear normal.      Left Ear: Tympanic membrane, ear canal and external ear normal.      Nose: Congestion present.      Right Sinus: Frontal sinus tenderness present. No maxillary sinus tenderness.      Left Sinus: Frontal sinus tenderness present. No maxillary sinus tenderness.      Mouth/Throat:      Lips: No lesions.      Mouth: Mucous membranes are moist. No oral lesions or angioedema.      Tongue: No lesions. Tongue does not deviate from midline.      Palate: No mass and lesions.      Pharynx: Uvula midline. Posterior oropharyngeal erythema present. No oropharyngeal exudate or uvula swelling.      Tonsils: No tonsillar abscesses.   Eyes:      General: Vision grossly intact. Gaze aligned appropriately. No visual field deficit.     Extraocular Movements: Extraocular movements intact.      Conjunctiva/sclera: Conjunctivae normal.       Pupils: Pupils are equal, round, and reactive to light.      Right eye: Pupil is round, reactive and not sluggish.      Left eye: Pupil is round, reactive and not sluggish.      Funduscopic exam:     Right eye: Red reflex present.         Left eye: Red reflex present.  Neck:      Trachea: Trachea normal. No abnormal tracheal secretions or tracheal deviation.   Cardiovascular:      Rate and Rhythm: Normal rate and regular rhythm.      Pulses: Normal pulses.      Heart sounds: Normal heart sounds.   Pulmonary:      Effort: Pulmonary effort is normal. No tachypnea, accessory muscle usage, prolonged expiration, respiratory distress or retractions.      Breath sounds: No stridor, decreased air movement or transmitted upper airway sounds. Examination of the right-upper field reveals wheezing. Examination of the left-upper field reveals wheezing. Examination of the left-middle field reveals rales. Wheezing and rales present. No rhonchi.   Musculoskeletal:         General: Normal range of motion.      Cervical back: Full passive range of motion without pain, normal range of motion and neck supple. No erythema, rigidity, tenderness or crepitus. No pain with movement. Normal range of motion.   Lymphadenopathy:      Cervical: No cervical adenopathy.   Skin:     General: Skin is warm and dry.      Findings: No rash.   Neurological:      Mental Status: She is alert. Mental status is at baseline.      Sensory: Sensation is intact.      Motor: Motor function is intact.      Coordination: Coordination is intact.      Gait: Gait is intact.   Psychiatric:         Mood and Affect: Mood normal.         Behavior: Behavior normal.         Thought Content: Thought content normal.       DX-CHEST-2 VIEWS  Result Date: 5/9/2025 5/9/2025 12:22 PM HISTORY/REASON FOR EXAM:  Cough, congestion TECHNIQUE/EXAM DESCRIPTION AND NUMBER OF VIEWS: Two views of the chest. COMPARISON:  3/16/2024. FINDINGS: LUNGS: Clear. No effusions. PNEUMOTHORAX: None.  LINES AND TUBES: None. MEDIASTINUM: No cardiomegaly. MUSCULOSKELETAL STRUCTURES: No acute displaced fracture.     No acute cardiopulmonary abnormality.    Assessment & Plan    1. Acute cough   - DX-CHEST-2 VIEWS; Future    2. Acute non-recurrent pansinusitis   - doxycycline (VIBRAMYCIN) 100 MG Tab; Take 1 Tablet by mouth 2 times a day for 7 days.  Dispense: 14 Tablet; Refill: 0    3. Wheezing   - predniSONE (DELTASONE) 20 MG Tab; Take 1 Tablet by mouth every day for 5 days.  Dispense: 5 Tablet; Refill: 0  - albuterol 108 (90 Base) MCG/ACT Aero Soln inhalation aerosol; Inhale 1-2 Puffs every 6 hours as needed for Shortness of Breath.  Dispense: 8.5 g; Refill: 0       MDM/Comments:   Patient meets IDSA guidelines for ABRS given duration of symptoms, worsening severity, clinical history and physical exam  Consider antihistamine, nasal steroid, anti-inflammatory, and saline rinses  No evidence of venous sinus thrombosis or more serious etiology  Typically these infections display a slow resolution of symptoms starting at 48-72 hours of treatment.  Mild pressure and pain may continue at end of week of antibiotics which is normal and continue the other supportive care until back to baseline     Recommended supportive treatment at home:  OTC Tylenol for fever/discomfort.  OTC supportive care for nasal congestion - saline nasal spray/Flonase nasal spray and/or netipot  Humidifier and steam inhalation/warm showers.  Increase oral fluid intake.  Warm saline gargles for sore throat.     Illness progression and alarm symptoms discussed with patient, emphasizing low threshold for returning to clinic/emergency department for worsening symptoms. Patient is agreeable to the plan and verbalizes understanding, and will follow up if warranted.       Differential diagnosis, natural history, supportive care, and indications for immediate follow-up discussed.      Follow-up as needed if symptoms worsen or fail to improve to PCP, Urgent  care or Emergency Room.            I personally reviewed prior external notes and test results pertinent to today's visit.  I have independently reviewed and interpreted all diagnostics ordered during this urgent care visit.                                     Electronically signed by CLAUDIA Harrison

## 2025-05-09 NOTE — LETTER
[facility]  [address]  [place] [address]     May 9, 2025    Patient: Hayley Cobos   YOB: 1990   Date of Visit: 5/9/2025       To Whom It May Concern:    Hayley Cobos was seen and treated in our department on 5/9/2025.     Please excuse Hayley from work 5/8/25-5/9/25.           Sincerely,     SHANTELL Everett.

## 2025-06-01 ENCOUNTER — HOSPITAL ENCOUNTER (EMERGENCY)
Facility: MEDICAL CENTER | Age: 35
End: 2025-06-02
Attending: STUDENT IN AN ORGANIZED HEALTH CARE EDUCATION/TRAINING PROGRAM
Payer: COMMERCIAL

## 2025-06-01 ENCOUNTER — APPOINTMENT (OUTPATIENT)
Dept: RADIOLOGY | Facility: MEDICAL CENTER | Age: 35
End: 2025-06-01
Attending: STUDENT IN AN ORGANIZED HEALTH CARE EDUCATION/TRAINING PROGRAM
Payer: COMMERCIAL

## 2025-06-01 VITALS
OXYGEN SATURATION: 97 % | WEIGHT: 192.9 LBS | TEMPERATURE: 97.5 F | DIASTOLIC BLOOD PRESSURE: 76 MMHG | RESPIRATION RATE: 17 BRPM | HEART RATE: 69 BPM | SYSTOLIC BLOOD PRESSURE: 112 MMHG | BODY MASS INDEX: 30.28 KG/M2 | HEIGHT: 67 IN

## 2025-06-01 DIAGNOSIS — R14.0 ABDOMINAL BLOATING: Primary | ICD-10-CM

## 2025-06-01 DIAGNOSIS — R11.2 NAUSEA AND VOMITING, UNSPECIFIED VOMITING TYPE: ICD-10-CM

## 2025-06-01 LAB
ALBUMIN SERPL BCP-MCNC: 4.3 G/DL (ref 3.2–4.9)
ALBUMIN/GLOB SERPL: 1.3 G/DL
ALP SERPL-CCNC: 98 U/L (ref 30–99)
ALT SERPL-CCNC: 28 U/L (ref 2–50)
ANION GAP SERPL CALC-SCNC: 13 MMOL/L (ref 7–16)
ANISOCYTOSIS BLD QL SMEAR: ABNORMAL
APPEARANCE UR: CLEAR
AST SERPL-CCNC: 22 U/L (ref 12–45)
BASOPHILS # BLD AUTO: 0.9 % (ref 0–1.8)
BASOPHILS # BLD: 0.07 K/UL (ref 0–0.12)
BILIRUB SERPL-MCNC: <0.2 MG/DL (ref 0.1–1.5)
BILIRUB UR QL STRIP.AUTO: NEGATIVE
BUN SERPL-MCNC: 13 MG/DL (ref 8–22)
CALCIUM ALBUM COR SERPL-MCNC: 9.1 MG/DL (ref 8.5–10.5)
CALCIUM SERPL-MCNC: 9.3 MG/DL (ref 8.5–10.5)
CHLORIDE SERPL-SCNC: 104 MMOL/L (ref 96–112)
CO2 SERPL-SCNC: 21 MMOL/L (ref 20–33)
COLOR UR: YELLOW
CREAT SERPL-MCNC: 0.81 MG/DL (ref 0.5–1.4)
EOSINOPHIL # BLD AUTO: 0.2 K/UL (ref 0–0.51)
EOSINOPHIL NFR BLD: 2.6 % (ref 0–6.9)
ERYTHROCYTE [DISTWIDTH] IN BLOOD BY AUTOMATED COUNT: 39.3 FL (ref 35.9–50)
GFR SERPLBLD CREATININE-BSD FMLA CKD-EPI: 98 ML/MIN/1.73 M 2
GLOBULIN SER CALC-MCNC: 3.2 G/DL (ref 1.9–3.5)
GLUCOSE SERPL-MCNC: 97 MG/DL (ref 65–99)
GLUCOSE UR STRIP.AUTO-MCNC: NEGATIVE MG/DL
HCG SERPL QL: NEGATIVE
HCT VFR BLD AUTO: 42.1 % (ref 37–47)
HGB BLD-MCNC: 14.5 G/DL (ref 12–16)
KETONES UR STRIP.AUTO-MCNC: NEGATIVE MG/DL
LEUKOCYTE ESTERASE UR QL STRIP.AUTO: NEGATIVE
LIPASE SERPL-CCNC: 26 U/L (ref 11–82)
LYMPHOCYTES # BLD AUTO: 2.24 K/UL (ref 1–4.8)
LYMPHOCYTES NFR BLD: 29.8 % (ref 22–41)
MANUAL DIFF BLD: NORMAL
MCH RBC QN AUTO: 30 PG (ref 27–33)
MCHC RBC AUTO-ENTMCNC: 34.4 G/DL (ref 32.2–35.5)
MCV RBC AUTO: 87 FL (ref 81.4–97.8)
MICRO URNS: NORMAL
MICROCYTES BLD QL SMEAR: ABNORMAL
MONOCYTES # BLD AUTO: 0.7 K/UL (ref 0–0.85)
MONOCYTES NFR BLD AUTO: 9.7 % (ref 0–13.4)
MORPHOLOGY BLD-IMP: NORMAL
NEUTROPHILS # BLD AUTO: 4.27 K/UL (ref 1.82–7.42)
NEUTROPHILS NFR BLD: 57 % (ref 44–72)
NITRITE UR QL STRIP.AUTO: NEGATIVE
NRBC # BLD AUTO: 0 K/UL
NRBC BLD-RTO: 0 /100 WBC (ref 0–0.2)
PH UR STRIP.AUTO: 7 [PH] (ref 5–8)
PLATELET # BLD AUTO: 343 K/UL (ref 164–446)
PLATELET BLD QL SMEAR: NORMAL
PMV BLD AUTO: 8.8 FL (ref 9–12.9)
POTASSIUM SERPL-SCNC: 3.9 MMOL/L (ref 3.6–5.5)
PROT SERPL-MCNC: 7.5 G/DL (ref 6–8.2)
PROT UR QL STRIP: NEGATIVE MG/DL
RBC # BLD AUTO: 4.84 M/UL (ref 4.2–5.4)
RBC BLD AUTO: PRESENT
RBC UR QL AUTO: NEGATIVE
SODIUM SERPL-SCNC: 138 MMOL/L (ref 135–145)
SP GR UR STRIP.AUTO: >1.045
UROBILINOGEN UR STRIP.AUTO-MCNC: 0.2 EU/DL
WBC # BLD AUTO: 7.5 K/UL (ref 4.8–10.8)

## 2025-06-01 PROCEDURE — 96375 TX/PRO/DX INJ NEW DRUG ADDON: CPT

## 2025-06-01 PROCEDURE — 83690 ASSAY OF LIPASE: CPT

## 2025-06-01 PROCEDURE — 96374 THER/PROPH/DIAG INJ IV PUSH: CPT

## 2025-06-01 PROCEDURE — 80053 COMPREHEN METABOLIC PANEL: CPT

## 2025-06-01 PROCEDURE — 700105 HCHG RX REV CODE 258: Performed by: STUDENT IN AN ORGANIZED HEALTH CARE EDUCATION/TRAINING PROGRAM

## 2025-06-01 PROCEDURE — 84703 CHORIONIC GONADOTROPIN ASSAY: CPT

## 2025-06-01 PROCEDURE — 74177 CT ABD & PELVIS W/CONTRAST: CPT

## 2025-06-01 PROCEDURE — 85007 BL SMEAR W/DIFF WBC COUNT: CPT

## 2025-06-01 PROCEDURE — 700117 HCHG RX CONTRAST REV CODE 255: Performed by: STUDENT IN AN ORGANIZED HEALTH CARE EDUCATION/TRAINING PROGRAM

## 2025-06-01 PROCEDURE — 700102 HCHG RX REV CODE 250 W/ 637 OVERRIDE(OP): Performed by: STUDENT IN AN ORGANIZED HEALTH CARE EDUCATION/TRAINING PROGRAM

## 2025-06-01 PROCEDURE — 85027 COMPLETE CBC AUTOMATED: CPT

## 2025-06-01 PROCEDURE — 99285 EMERGENCY DEPT VISIT HI MDM: CPT

## 2025-06-01 PROCEDURE — A9270 NON-COVERED ITEM OR SERVICE: HCPCS | Performed by: STUDENT IN AN ORGANIZED HEALTH CARE EDUCATION/TRAINING PROGRAM

## 2025-06-01 PROCEDURE — 700111 HCHG RX REV CODE 636 W/ 250 OVERRIDE (IP): Performed by: STUDENT IN AN ORGANIZED HEALTH CARE EDUCATION/TRAINING PROGRAM

## 2025-06-01 PROCEDURE — 36415 COLL VENOUS BLD VENIPUNCTURE: CPT

## 2025-06-01 PROCEDURE — 81003 URINALYSIS AUTO W/O SCOPE: CPT

## 2025-06-01 RX ORDER — DICYCLOMINE HCL 20 MG
20 TABLET ORAL ONCE
Status: COMPLETED | OUTPATIENT
Start: 2025-06-01 | End: 2025-06-01

## 2025-06-01 RX ORDER — SODIUM CHLORIDE, SODIUM LACTATE, POTASSIUM CHLORIDE, CALCIUM CHLORIDE 600; 310; 30; 20 MG/100ML; MG/100ML; MG/100ML; MG/100ML
1000 INJECTION, SOLUTION INTRAVENOUS ONCE
Status: COMPLETED | OUTPATIENT
Start: 2025-06-01 | End: 2025-06-01

## 2025-06-01 RX ORDER — METOCLOPRAMIDE HYDROCHLORIDE 5 MG/ML
10 INJECTION INTRAMUSCULAR; INTRAVENOUS ONCE
Status: COMPLETED | OUTPATIENT
Start: 2025-06-01 | End: 2025-06-01

## 2025-06-01 RX ORDER — ONDANSETRON 2 MG/ML
4 INJECTION INTRAMUSCULAR; INTRAVENOUS ONCE
Status: COMPLETED | OUTPATIENT
Start: 2025-06-01 | End: 2025-06-01

## 2025-06-01 RX ADMIN — METOCLOPRAMIDE 10 MG: 5 INJECTION, SOLUTION INTRAMUSCULAR; INTRAVENOUS at 23:00

## 2025-06-01 RX ADMIN — DICYCLOMINE HYDROCHLORIDE 20 MG: 20 TABLET ORAL at 21:11

## 2025-06-01 RX ADMIN — ONDANSETRON 4 MG: 2 INJECTION INTRAMUSCULAR; INTRAVENOUS at 21:11

## 2025-06-01 RX ADMIN — IOHEXOL 100 ML: 350 INJECTION, SOLUTION INTRAVENOUS at 21:45

## 2025-06-01 RX ADMIN — SODIUM CHLORIDE, POTASSIUM CHLORIDE, SODIUM LACTATE AND CALCIUM CHLORIDE 1000 ML: 600; 310; 30; 20 INJECTION, SOLUTION INTRAVENOUS at 23:00

## 2025-06-01 ASSESSMENT — FIBROSIS 4 INDEX: FIB4 SCORE: 0.43

## 2025-06-01 ASSESSMENT — PAIN DESCRIPTION - PAIN TYPE
TYPE: ACUTE PAIN
TYPE: ACUTE PAIN

## 2025-06-02 RX ORDER — DICYCLOMINE HCL 20 MG
20 TABLET ORAL EVERY 6 HOURS
Qty: 30 TABLET | Refills: 0 | Status: SHIPPED | OUTPATIENT
Start: 2025-06-02

## 2025-06-02 RX ORDER — METOCLOPRAMIDE 10 MG/1
10 TABLET ORAL 2 TIMES DAILY PRN
Qty: 30 TABLET | Refills: 0 | Status: SHIPPED | OUTPATIENT
Start: 2025-06-02

## 2025-06-02 NOTE — ED TRIAGE NOTES
Hayley Cobos  34 y.o. female    Chief Complaint   Patient presents with    Abdominal Pain     Pt states for a long time she has been vomiting after eating, states the past couple days has been worse, unable to keep anything down and having severe diffuse abd pain.      Pt ambulated to triage for above complaint. Pt states has taken tums but has not helped.     Vitals:    06/01/25 2001   BP: 115/88   Pulse: 77   Resp: 16   Temp: 36.4 °C (97.5 °F)   SpO2: 96%       Triage process explained to patient, apologized for wait time[facility]  Pt informed to notify staff of any change in condition.

## 2025-06-02 NOTE — ED PROVIDER NOTES
"ED Provider Note    CHIEF COMPLAINT  Chief Complaint   Patient presents with    Abdominal Pain     Pt states for a long time she has been vomiting after eating, states the past couple days has been worse, unable to keep anything down and having severe diffuse abd pain.        EXTERNAL RECORDS REVIEWED  Reviewed outpatient family medicine note for medical history    Reviewed prior CT abdomen pelvis 4/23/2024 showed no biliary pathology, some postoperative changes, no appendicitis    HPI/ROS  LIMITATION TO HISTORY   Select: : None  OUTSIDE HISTORIAN(S):   at bedside    Hayley Cobos is a 34 y.o. female presenting to the emergency department for abdominal \"bloating\" and generalized abdominal discomfort.  Symptoms started this Friday.  Endorses worsening of her symptoms after eating.  Says that she has been able to pass stool and flatus.  She endorses several episodes of vomiting.  No diarrhea.  No fevers chills.  No dysuria or flank pain.  Says that she got a tummy tuck but no prior intra-abdominal surgeries.  Her pain is predominantly in the lower quadrants though she says that often the pain moves up into her upper abdomen.    PAST MEDICAL HISTORY   has a past medical history of Anxiety, Breast cyst, right, Depression, Endometriosis, and Migraine.    SURGICAL HISTORY   has a past surgical history that includes cystourethroscopy (N/A, 12/6/2021); vaginal hysterectomy scope total (N/A, 12/6/2021); and salpingectomy (Bilateral, 12/6/2021).    FAMILY HISTORY  Family History   Problem Relation Age of Onset    Hypertension Mother     Alcohol abuse Father     Drug abuse Father     Cancer Sister         Thyroid    Hyperlipidemia Maternal Grandmother     Hypertension Maternal Grandmother     Cancer Maternal Grandmother         Brain    Diabetes Maternal Grandmother     Stroke Maternal Grandmother        SOCIAL HISTORY  Social History     Tobacco Use    Smoking status: Some Days     Current packs/day: 0.00     " "Average packs/day: 0.5 packs/day for 15.0 years (7.5 ttl pk-yrs)     Types: Electronic Cigarettes, Cigarettes     Start date: 2007     Last attempt to quit: 2022     Years since quitting: 3.4    Smokeless tobacco: Never    Tobacco comments:     occasional   Vaping Use    Vaping status: Every Day    Substances: Nicotine   Substance and Sexual Activity    Alcohol use: Yes     Comment: Not often    Drug use: Yes     Types: Inhaled     Comment: marijuana    Sexual activity: Yes     Partners: Male     Birth control/protection: Male Sterilization, Female Sterilization       CURRENT MEDICATIONS  Home Medications       Reviewed by Karen Marshall R.N. (Registered Nurse) on 06/01/25 at 2008  Med List Status: Not Addressed     Medication Last Dose Status   albuterol 108 (90 Base) MCG/ACT Aero Soln inhalation aerosol  Active   benzonatate (TESSALON) 100 MG Cap  Active   Desvenlafaxine Succinate 100 MG TABLET SR 24 HR  Active   metoclopramide (REGLAN) 5 MG tablet  Active   ondansetron (ZOFRAN ODT) 4 MG TABLET DISPERSIBLE  Active   promethazine-dextromethorphan (PROMETHAZINE-DM) 6.25-15 MG/5ML syrup  Active   Rimegepant Sulfate (NURTEC) 75 MG TABLET DISPERSIBLE  Active   sumatriptan (IMITREX) 20 MG/ACT nasal spray  Active                    ALLERGIES  Allergies[1]    PHYSICAL EXAM  VITAL SIGNS: /76   Pulse 69   Temp 36.4 °C (97.5 °F) (Temporal)   Resp 17   Ht 1.702 m (5' 7\")   Wt 87.5 kg (192 lb 14.4 oz)   LMP 12/28/2020   SpO2 97%   BMI 30.21 kg/m²    General:  non-toxic, no acute distress  Neuro: oriented x 3, moving all extremities.   HEENT:   - Head: Normocephalic, atraumatic  - Eyes: PERRL  - Ears/Nose: normal external nose and ears  - Mouth: moist mucosal membranes  Resp: clear to auscultation, no increased work of breathing  CV: Regular rate and rhythm  Abd: Soft,  mildly distended.  Mild bilateral lower quadrant tenderness.  No right upper quadrant tenderness.  Gonzales's negative.  No rigidity or " guarding.  Extremities: No peripheral edema  Psych: lucid and conversational             DIAGNOSTIC STUDIES / PROCEDURES    LABS and ECG  Results for orders placed or performed during the hospital encounter of 06/01/25   CBC with Differential    Collection Time: 06/01/25  8:46 PM   Result Value Ref Range    WBC 7.5 4.8 - 10.8 K/uL    RBC 4.84 4.20 - 5.40 M/uL    Hemoglobin 14.5 12.0 - 16.0 g/dL    Hematocrit 42.1 37.0 - 47.0 %    MCV 87.0 81.4 - 97.8 fL    MCH 30.0 27.0 - 33.0 pg    MCHC 34.4 32.2 - 35.5 g/dL    RDW 39.3 35.9 - 50.0 fL    Platelet Count 343 164 - 446 K/uL    MPV 8.8 (L) 9.0 - 12.9 fL    Neutrophils-Polys 57.00 44.00 - 72.00 %    Lymphocytes 29.80 22.00 - 41.00 %    Monocytes 9.70 0.00 - 13.40 %    Eosinophils 2.60 0.00 - 6.90 %    Basophils 0.90 0.00 - 1.80 %    Nucleated RBC 0.00 0.00 - 0.20 /100 WBC    Neutrophils (Absolute) 4.27 1.82 - 7.42 K/uL    Lymphs (Absolute) 2.24 1.00 - 4.80 K/uL    Monos (Absolute) 0.70 0.00 - 0.85 K/uL    Eos (Absolute) 0.20 0.00 - 0.51 K/uL    Baso (Absolute) 0.07 0.00 - 0.12 K/uL    NRBC (Absolute) 0.00 K/uL    Anisocytosis 1+     Microcytosis 1+    Complete Metabolic Panel    Collection Time: 06/01/25  8:46 PM   Result Value Ref Range    Sodium 138 135 - 145 mmol/L    Potassium 3.9 3.6 - 5.5 mmol/L    Chloride 104 96 - 112 mmol/L    Co2 21 20 - 33 mmol/L    Anion Gap 13.0 7.0 - 16.0    Glucose 97 65 - 99 mg/dL    Bun 13 8 - 22 mg/dL    Creatinine 0.81 0.50 - 1.40 mg/dL    Calcium 9.3 8.5 - 10.5 mg/dL    Correct Calcium 9.1 8.5 - 10.5 mg/dL    AST(SGOT) 22 12 - 45 U/L    ALT(SGPT) 28 2 - 50 U/L    Alkaline Phosphatase 98 30 - 99 U/L    Total Bilirubin <0.2 0.1 - 1.5 mg/dL    Albumin 4.3 3.2 - 4.9 g/dL    Total Protein 7.5 6.0 - 8.2 g/dL    Globulin 3.2 1.9 - 3.5 g/dL    A-G Ratio 1.3 g/dL   Lipase    Collection Time: 06/01/25  8:46 PM   Result Value Ref Range    Lipase 26 11 - 82 U/L   HCG QUAL SERUM    Collection Time: 06/01/25  8:46 PM   Result Value Ref Range     Beta-Hcg Qualitative Serum Negative Negative   DIFFERENTIAL MANUAL    Collection Time: 06/01/25  8:46 PM   Result Value Ref Range    Manual Diff Status PERFORMED    PERIPHERAL SMEAR REVIEW    Collection Time: 06/01/25  8:46 PM   Result Value Ref Range    Peripheral Smear Review see below    PLATELET ESTIMATE    Collection Time: 06/01/25  8:46 PM   Result Value Ref Range    Plt Estimation Normal    MORPHOLOGY    Collection Time: 06/01/25  8:46 PM   Result Value Ref Range    RBC Morphology Present    ESTIMATED GFR    Collection Time: 06/01/25  8:46 PM   Result Value Ref Range    GFR (CKD-EPI) 98 >60 mL/min/1.73 m 2   Urinalysis    Collection Time: 06/01/25 10:14 PM    Specimen: Urine   Result Value Ref Range    Color Yellow     Character Clear     Specific Gravity >1.045 <1.035    Ph 7.0 5.0 - 8.0    Glucose Negative Negative mg/dL    Ketones Negative Negative mg/dL    Protein Negative Negative mg/dL    Bilirubin Negative Negative    Urobilinogen, Urine 0.2 <=1.0 EU/dL    Nitrite Negative Negative    Leukocyte Esterase Negative Negative    Occult Blood Negative Negative    Micro Urine Req see below        RADIOLOGY  I have independently interpreted the diagnostic imaging associated with this visit and am waiting the final reading from the radiologist.   My preliminary interpretation is as follows:   - CT abdomen pelvis reviewed shows no intra-abdominal process  Radiologist interpretation:   CT-ABDOMEN-PELVIS WITH   Final Result      No acute abnormality in the abdomen or pelvis.              MEDICAL DECISION MAKING      ED COURSE AND PLAN    34-year-old female presenting to the emergency department for abdominal bloating, several episodes of vomiting.  Postprandial nature to the symptoms.  Has experienced these symptoms for the last 3 days but she has had similar symptoms in the past.  She has a relatively benign abdominal exam.  Bedside ultrasound of the abdomen shows lots of gas, I was unable to identify her  gallbladder.  She does not have any focal right upper quadrant tenderness to palpation.  Prior CT abdomen pelvis did not show any biliary stones.  Will plan to obtain basic labs, perform CT abdomen pelvis, administer Bentyl and Zofran.    Lab workup completely unremarkable.  Urine is clean.  CT abdomen pelvis shows no intra-abdominal process.  No significant constipation.  After IV fluids, Reglan, Bentyl, Zofran, patient's abdominal pain and nausea were well-controlled  I wrote a prescription for Bentyl and Reglan to go home with.  Patient is appropriate for discharge home.           Hydration: Based on the patient's presentation of Dehydration the patient was given IV fluids. IV Hydration was used because oral hydration was not adequate alone. Upon recheck following hydration, the patient was stable.    Procedures:      ----------------------------------------------------------------------------------  DISCUSSIONS    I have discussed management of the patient with the following physicians and ALEC's:      Discussion of management with other Q or appropriate source(s):     Escalation of care considered, and ultimately not performed:    Barriers to care at this time, including but not limited to:     Decision tools and prescription drugs considered including, but not limited to: Reglan, Bentyl    FINAL IMPRESSION    1. Abdominal bloating    2. Nausea and vomiting, unspecified vomiting type        Discharge Medication List as of 6/2/2025 12:16 AM        START taking these medications    Details   dicyclomine (BENTYL) 20 MG Tab Take 1 Tablet by mouth every 6 hours., Disp-30 Tablet, R-0, Normal             No follow-up provider specified.      DISPOSITION    Discharge home, Stable        This chart was dictated using an electronic voice recognition software. The chart has been reviewed and edited but there is still possibility for dictation errors due to limitation of software.    Mynor Golden,  6/1/2025           [1]   Allergies  Allergen Reactions    Bupropion Itching    Latex Hives and Rash     Rash/hives    Topiramate      Numbness, tingling, nightmares    Triptans [Sumatriptan]      Palpitations

## 2025-06-02 NOTE — ED NOTES
Pt ambulated from triage to green 26 with a steady gait and no assistance. No complication or abnormality to note. Pt was successfully changed into gown, placed on the monitor, and call light placed within reach. Chart is now up for ERP.

## 2025-06-02 NOTE — DISCHARGE INSTRUCTIONS
You were seen in the emergency department for abdominal pain.  Your labs, urinalysis, CT of your abdomen were reassuring.    Return to the Emergency Department if you experience:  Worsening pain.  Associated fevers, chills, body aches.  Intractable nausea and vomiting  Severe dehydration    We saw you for a snapshot in time in the emergency department, if you have evolution of your symptoms, do not hesitate to come back to the emergency department for reevaluation.

## 2025-06-02 NOTE — ED NOTES
Pt ambulated to room, placed on cardiac and o2 monitor with family at bedside. Call light in reach

## 2025-06-03 ENCOUNTER — HOSPITAL ENCOUNTER (OUTPATIENT)
Dept: RADIOLOGY | Facility: MEDICAL CENTER | Age: 35
End: 2025-06-03
Attending: NURSE PRACTITIONER
Payer: COMMERCIAL

## 2025-06-03 DIAGNOSIS — R49.0 HOARSE VOICE QUALITY: ICD-10-CM

## 2025-06-03 DIAGNOSIS — R09.A2 SENSATION OF LUMP IN THROAT: ICD-10-CM

## 2025-06-03 DIAGNOSIS — R47.02 DYSPHASIA: ICD-10-CM

## 2025-06-03 PROCEDURE — 76536 US EXAM OF HEAD AND NECK: CPT

## 2025-06-06 ENCOUNTER — OFFICE VISIT (OUTPATIENT)
Dept: MEDICAL GROUP | Facility: PHYSICIAN GROUP | Age: 35
End: 2025-06-06
Payer: COMMERCIAL

## 2025-06-06 VITALS
HEART RATE: 85 BPM | HEIGHT: 67 IN | TEMPERATURE: 97.6 F | WEIGHT: 190.3 LBS | RESPIRATION RATE: 12 BRPM | BODY MASS INDEX: 29.87 KG/M2 | SYSTOLIC BLOOD PRESSURE: 114 MMHG | DIASTOLIC BLOOD PRESSURE: 82 MMHG

## 2025-06-06 DIAGNOSIS — R59.0 REACTIVE CERVICAL LYMPHADENOPATHY: ICD-10-CM

## 2025-06-06 DIAGNOSIS — R49.0 HOARSENESS OF VOICE: ICD-10-CM

## 2025-06-06 DIAGNOSIS — R76.8 ANTI-TPO ANTIBODIES PRESENT: ICD-10-CM

## 2025-06-06 DIAGNOSIS — K21.9 GASTROESOPHAGEAL REFLUX DISEASE, UNSPECIFIED WHETHER ESOPHAGITIS PRESENT: ICD-10-CM

## 2025-06-06 DIAGNOSIS — R09.A2 GLOBUS SENSATION: Primary | ICD-10-CM

## 2025-06-06 PROBLEM — R05.1 ACUTE COUGH: Status: RESOLVED | Noted: 2025-05-06 | Resolved: 2025-06-06

## 2025-06-06 PROCEDURE — 3079F DIAST BP 80-89 MM HG: CPT | Performed by: FAMILY MEDICINE

## 2025-06-06 PROCEDURE — 99214 OFFICE O/P EST MOD 30 MIN: CPT | Performed by: FAMILY MEDICINE

## 2025-06-06 PROCEDURE — 3074F SYST BP LT 130 MM HG: CPT | Performed by: FAMILY MEDICINE

## 2025-06-06 RX ORDER — OMEPRAZOLE 20 MG/1
20 CAPSULE, DELAYED RELEASE ORAL DAILY
Qty: 90 CAPSULE | Refills: 2 | Status: SHIPPED | OUTPATIENT
Start: 2025-06-06

## 2025-06-06 ASSESSMENT — FIBROSIS 4 INDEX: FIB4 SCORE: 0.4

## 2025-06-06 NOTE — PATIENT INSTRUCTIONS
[facility]  [address] [facility] [address]  Phone: [phone]    - Start Omeprazole daily to see if this helps with your hoarseness of voice and sensation of something stuck in throat    - we will continue to monitor thyroid labs    - we will repeat US in 3mos. To follow lymph node, which is likely just reacting to infection       Today we placed a specialty referral for you. You should be hearing from our [facility] scheduling team within the next 1-2 weeks with further details. If you have [facility], then the specific information will be sent there, so please watch for this. It will then be up to you to reach out to the specialty office to get scheduled. If you have not heard from our referral team in the next two weeks, please do not hesitate to reach out to our clinic.

## 2025-06-06 NOTE — PROGRESS NOTES
"Verbal consent was acquired by the patient to use [address] ambient listening note generation during this visit Yes    Subjective:     HPI:   History of Present Illness  The patient presents for evaluation of globus sensation, hoarseness, anti-TPO antibodies, and GERD.    Globus sensation  - Persistent throat foreign body sensation  - Intermittent hoarseness and dysphagia, especially with solids  - Postnasal drainage has subsided after antibiotics for a respiratory infection  - No chronic sore throat    GERD  - Daily heartburn  - Managed with omeprazole as needed    Abdominal distension  - Severe, worsening abdominal distension  - On 06/01/2025, sought emergency care for intractable vomiting and severe abdominal pain  - Consulted a gastroenterologist who ordered tests including a gastric emptying study, but results are pending after 6 months  - No colonoscopy    Hashimoto's disease  - Currently being evaluated due to suggestive symptoms    Supplemental information: anti-TPO antibodies    FAMILY HISTORY  - Sister and great-grandmother had thyroid cancer        Objective:     Exam:  /82 (BP Location: Right arm, Patient Position: Sitting, BP Cuff Size: Adult)   Pulse 85   Temp 36.4 °C (97.6 °F) (Temporal)   Resp 12   Ht 1.702 m (5' 7\")   Wt 86.3 kg (190 lb 4.8 oz)   LMP 12/28/2020   BMI 29.81 kg/m²  Body mass index is 29.81 kg/m².    Physical Exam  Vitals and nursing note reviewed.   Constitutional:       Appearance: Normal appearance.   HENT:      Head: Normocephalic and atraumatic.      Mouth/Throat:      Pharynx: Oropharynx is clear.   Cardiovascular:      Rate and Rhythm: Normal rate and regular rhythm.   Pulmonary:      Effort: Pulmonary effort is normal.      Breath sounds: Normal breath sounds.   Lymphadenopathy:      Cervical: No cervical adenopathy.   Neurological:      Mental Status: She is alert. Mental status is at baseline.   Psychiatric:         Mood and Affect: Mood normal. "             Results  - Labs:    - Anti-TPO antibody: Slightly high    - TSH: Normal    - Free T4: Normal    - Imaging:    - Thyroid ultrasound (06/03/2025): Heterogeneous thyroid gland, no dominant mass. 1.3 cm reactive lymph node in upper left neck    Assessment & Plan:     No diagnosis found.    Assessment & Plan  1. Globus sensation with hoarseness: Likely due to inadequately managed reflux. Discussed ultrasound results showing normal thyroid and reactive lymph node.  - Start omeprazole 20 mg daily. Prescription sent for 90 days.  - ENT referral placed in case symptoms do not improve, in which case she should have visualization of posterior pharynx.  - Repeat ultrasound in 3 months, though reactive lymphadenopathy most likely given recent illness    2. Anti-TPO antibodies: No indication of Hashimoto's disease with normal TSH, free T4, and thyroid ultrasound.  - Repeat thyroid labs in 3-6 months.  - Discussed that a large percentage of the population have positive antibodies without ever developing thyroid disease    3. GERD and chronic abdominal pain/bloating: Advised follow-up with her GI specialist, whom ordered the testing. She completed this, but never heard results and is still having symptoms.   - Start omeprazole 20 mg daily instead of as needed.   - Provided contact information for GI group, and advised her to schedule a follow up visit.    Follow-up  - Scheduled for 06/27/2025 to reassess symptoms after starting daily omeprazole.          No follow-ups on file.    Please note that this dictation was created using voice recognition software. I have made every reasonable attempt to correct obvious errors, but I expect that there are errors of grammar and possibly content that I did not discover before finalizing the note.

## 2025-06-09 ENCOUNTER — RESULTS FOLLOW-UP (OUTPATIENT)
Dept: MEDICAL GROUP | Facility: PHYSICIAN GROUP | Age: 35
End: 2025-06-09

## 2025-06-16 NOTE — Clinical Note
REFERRAL APPROVAL NOTICE         Sent on June 16, 2025                   Hayley Gijairon Cobos  [address] [place] [address]                   Dear MsDaisy Cobos,    After a careful review of the medical information and benefit coverage, [facility] has processed your referral. See below for additional details.    If applicable, you must be actively enrolled with your insurance for coverage of the authorized service. If you have any questions regarding your coverage, please contact your insurance directly.    REFERRAL INFORMATION   Referral #:  [id number]  Referred-To Provider    Referred-By Provider:  Otolaryngology    Shy Monique M.D.   [facility]      [address] [place] [address]  [phone] [address] [place] [address]  [phone]    Referral Start Date:  06/06/2025  Referral End Date:   06/06/2026             SCHEDULING  If you do not already have an appointment, please call [phone] to make an appointment.     MORE INFORMATION  If you do not already have a [facility] account, sign up at: [email or web address]  You can access your medical information, make appointments, see lab results, billing information, and more.  If you have questions regarding this referral, please contact  the [facility] Referrals department at:             [phone]. Monday - Friday 8:00AM - 5:00PM.     Sincerely,    [facility]

## (undated) DEVICE — PAD BABY LAP 4X18 W/O - RINGS PREWASHED 5/PK 40PK/CS

## (undated) DEVICE — ELECTRODE DUAL RETURN W/ CORD - (50/PK)

## (undated) DEVICE — GLOVE SZ 7.5 BIOGEL PI MICRO - PF LF (50PR/BX)

## (undated) DEVICE — GLOVE SZ 6.5 BIOGEL PI MICRO - PF LF (50PR/BX)

## (undated) DEVICE — TRAY SRGPRP PVP IOD WT PRP - (20/CA)

## (undated) DEVICE — SUTURE GENERAL

## (undated) DEVICE — MASK ANESTHESIA ADULT  - (100/CA)

## (undated) DEVICE — BANDAID X-LARGE 2 X 4 IN LF (50EA/BX)

## (undated) DEVICE — GLOVE BIOGEL PI INDICATOR SZ 6.5 SURGICAL PF LF - (50/BX 4BX/CA)

## (undated) DEVICE — PAD SANITARY 11IN MAXI IND WRAPPED  (12EA/PK 24PK/CA)

## (undated) DEVICE — LIGASURE 5MM BLUNT TIP LONG - 44CM (6EA/PK)

## (undated) DEVICE — PACK MINOR BASIN - (2EA/CA)

## (undated) DEVICE — WATER IRRIGATION STERILE 1000ML (12EA/CA)

## (undated) DEVICE — BANDAID SHEER STRIP 3/4 IN (100EA/BX 12BX/CA)

## (undated) DEVICE — SODIUM CHL IRRIGATION 0.9% 1000ML (12EA/CA)

## (undated) DEVICE — GOWN WARMING STANDARD FLEX - (30/CA)

## (undated) DEVICE — NEPTUNE 4 PORT MANIFOLD - (20/PK)

## (undated) DEVICE — SUCTION INSTRUMENT YANKAUER BULBOUS TIP W/O VENT (50EA/CA)

## (undated) DEVICE — TOWEL STOP TIMEOUT SAFETY FLAG (40EA/CA)

## (undated) DEVICE — TROCAR LAPSCP 100MM 12MM NTHRD - (6/BX)

## (undated) DEVICE — GLOVE BIOGEL PI INDICATOR SZ 7.5 SURGICAL PF LF -(50/BX 4BX/CA)

## (undated) DEVICE — TUBING CLEARLINK DUO-VENT - C-FLO (48EA/CA)

## (undated) DEVICE — GLOVE SZ 7 BIOGEL PI MICRO - PF LF (50PR/BX 4BX/CA)

## (undated) DEVICE — LACTATED RINGERS INJ 1000 ML - (14EA/CA 60CA/PF)

## (undated) DEVICE — GLOVE BIOGEL PI INDICATOR SZ 7.0 SURGICAL PF LF - (50/BX 4BX/CA)

## (undated) DEVICE — SET SUCTION/IRRIGATION WITH DISPOSABLE TIP (6/CA )PART #0250-070-520 IS A SUB

## (undated) DEVICE — SUTURE 0 COATED VICRYL 6-18IN - (12PK/BX)

## (undated) DEVICE — SET IRRIGATION CYSTOSCOPY TUBE L80 IN (20EA/CA)

## (undated) DEVICE — ARMREST CRADLE FOAM - (2PR/PK 12PR/CA)

## (undated) DEVICE — SET LEADWIRE 5 LEAD BEDSIDE DISPOSABLE ECG (1SET OF 5/EA)

## (undated) DEVICE — ELECTRODE 850 FOAM ADHESIVE - HYDROGEL RADIOTRNSPRNT (50/PK)

## (undated) DEVICE — SENSOR SPO2 NEO LNCS ADHESIVE (20/BX) SEE USER NOTES

## (undated) DEVICE — SLEEVE VASO CALF MED - (10PR/CA)

## (undated) DEVICE — GLOVE BIOGEL SZ 7.5 SURGICAL PF LTX - (50PR/BX 4BX/CA)

## (undated) DEVICE — TRAY FOLEY CATHETER STATLOCK 16FR SURESTEP  (10EA/CA)

## (undated) DEVICE — KIT  I.V. START (100EA/CA)

## (undated) DEVICE — SUTURE 0 VICRYL PLUS CT-2 - 27 INCH (36/BX)

## (undated) DEVICE — CANISTER SUCTION RIGID RED 1500CC (40EA/CA)

## (undated) DEVICE — CANNULA W/SEAL 5X100 Z-THRE - ADED KII (12/BX)

## (undated) DEVICE — SUTURE 4-0 VICRYL PLUSFS-1 - 27 INCH (36/BX)

## (undated) DEVICE — KIT ANESTHESIA W/CIRCUIT & 3/LT BAG W/FILTER (20EA/CA)

## (undated) DEVICE — TROCAR 5X100 BLADED Z-THREAD - KII (6/BX)

## (undated) DEVICE — CANISTER SUCTION 3000ML MECHANICAL FILTER AUTO SHUTOFF MEDI-VAC NONSTERILE LF DISP  (40EA/CA)

## (undated) DEVICE — LAPAROSCOPIC APPLICATOR BELLOW HEMOBLAST (12EA/CA)

## (undated) DEVICE — NEEDLE INSFL 120MM 14GA VRRS - (20/BX)

## (undated) DEVICE — TUBE CONNECTING SUCTION - CLEAR PLASTIC STERILE 72 IN (50EA/CA)

## (undated) DEVICE — PACK LAPAROSCOPY - (1/CA)

## (undated) DEVICE — CHLORAPREP 26 ML APPLICATOR - ORANGE TINT(25/CA)

## (undated) DEVICE — AGENT HEMOSTATIC BELLOW HEMOBLAST (12EA/CA)

## (undated) DEVICE — CATHETER IV 20 GA X 1-1/4 ---SURG.& SDS ONLY--- (50EA/BX)

## (undated) DEVICE — PROTECTOR ULNA NERVE - (36PR/CA)

## (undated) DEVICE — SUTURE 0 VICRYL PLUS CT-1 - 36 INCH (36/BX)

## (undated) DEVICE — BLADE SURGICAL #10 - (50/BX)

## (undated) DEVICE — HEAD HOLDER JUNIOR/ADULT

## (undated) DEVICE — SUTURE 2-0 VICRYL PLUS CT-2 - 27 INCH (36/BX)